# Patient Record
Sex: MALE | Race: WHITE | Employment: PART TIME | URBAN - METROPOLITAN AREA
[De-identification: names, ages, dates, MRNs, and addresses within clinical notes are randomized per-mention and may not be internally consistent; named-entity substitution may affect disease eponyms.]

---

## 2019-07-10 ENCOUNTER — APPOINTMENT (OUTPATIENT)
Dept: CARDIOLOGY | Facility: CLINIC | Age: 19
DRG: 316 | End: 2019-07-10
Attending: EMERGENCY MEDICINE
Payer: COMMERCIAL

## 2019-07-10 ENCOUNTER — APPOINTMENT (OUTPATIENT)
Dept: GENERAL RADIOLOGY | Facility: CLINIC | Age: 19
DRG: 316 | End: 2019-07-10
Attending: EMERGENCY MEDICINE
Payer: COMMERCIAL

## 2019-07-10 ENCOUNTER — HOSPITAL ENCOUNTER (INPATIENT)
Facility: CLINIC | Age: 19
LOS: 3 days | Discharge: HOME | DRG: 315 | End: 2019-07-13
Attending: INTERNAL MEDICINE | Admitting: INTERNAL MEDICINE
Payer: COMMERCIAL

## 2019-07-10 ENCOUNTER — HOSPITAL ENCOUNTER (INPATIENT)
Facility: CLINIC | Age: 19
LOS: 1 days | Discharge: ANOTHER HOSPITAL | DRG: 316 | End: 2019-07-10
Attending: EMERGENCY MEDICINE | Admitting: HOSPITALIST
Payer: COMMERCIAL

## 2019-07-10 VITALS
DIASTOLIC BLOOD PRESSURE: 66 MMHG | RESPIRATION RATE: 20 BRPM | HEART RATE: 85 BPM | SYSTOLIC BLOOD PRESSURE: 105 MMHG | WEIGHT: 160 LBS | OXYGEN SATURATION: 100 % | TEMPERATURE: 98 F

## 2019-07-10 DIAGNOSIS — I31.9 MYOPERICARDITIS: ICD-10-CM

## 2019-07-10 DIAGNOSIS — I41: Primary | ICD-10-CM

## 2019-07-10 DIAGNOSIS — J02.0 STREPTOCOCCAL PHARYNGITIS: ICD-10-CM

## 2019-07-10 PROBLEM — I51.4 MYOCARDITIS (H): Status: ACTIVE | Noted: 2019-07-10

## 2019-07-10 LAB
ANION GAP SERPL CALCULATED.3IONS-SCNC: 9 MMOL/L (ref 3–14)
BASOPHILS # BLD AUTO: 0.1 10E9/L (ref 0–0.2)
BASOPHILS NFR BLD AUTO: 0.5 %
BUN SERPL-MCNC: 14 MG/DL (ref 7–30)
CALCIUM SERPL-MCNC: 9.7 MG/DL (ref 8.5–10.1)
CHLORIDE SERPL-SCNC: 104 MMOL/L (ref 94–109)
CO2 SERPL-SCNC: 23 MMOL/L (ref 20–32)
CREAT SERPL-MCNC: 1.01 MG/DL (ref 0.66–1.25)
CRP SERPL-MCNC: 114 MG/L (ref 0–8)
DIFFERENTIAL METHOD BLD: NORMAL
EOSINOPHIL # BLD AUTO: 0.1 10E9/L (ref 0–0.7)
EOSINOPHIL NFR BLD AUTO: 0.5 %
ERYTHROCYTE [DISTWIDTH] IN BLOOD BY AUTOMATED COUNT: 11.6 % (ref 10–15)
ERYTHROCYTE [SEDIMENTATION RATE] IN BLOOD BY WESTERGREN METHOD: 21 MM/H (ref 0–15)
GFR SERPL CREATININE-BSD FRML MDRD: >90 ML/MIN/{1.73_M2}
GLUCOSE SERPL-MCNC: 124 MG/DL (ref 70–99)
HCT VFR BLD AUTO: 44.8 % (ref 40–53)
HGB BLD-MCNC: 15.8 G/DL (ref 13.3–17.7)
IMM GRANULOCYTES # BLD: 0 10E9/L (ref 0–0.4)
IMM GRANULOCYTES NFR BLD: 0.4 %
INTERPRETATION ECG - MUSE: NORMAL
LYMPHOCYTES # BLD AUTO: 1.9 10E9/L (ref 0.8–5.3)
LYMPHOCYTES NFR BLD AUTO: 17.4 %
MCH RBC QN AUTO: 30.3 PG (ref 26.5–33)
MCHC RBC AUTO-ENTMCNC: 35.3 G/DL (ref 31.5–36.5)
MCV RBC AUTO: 86 FL (ref 78–100)
MONOCYTES # BLD AUTO: 1.1 10E9/L (ref 0–1.3)
MONOCYTES NFR BLD AUTO: 9.8 %
NEUTROPHILS # BLD AUTO: 7.8 10E9/L (ref 1.6–8.3)
NEUTROPHILS NFR BLD AUTO: 71.4 %
NRBC # BLD AUTO: 0 10*3/UL
NRBC BLD AUTO-RTO: 0 /100
PLATELET # BLD AUTO: 257 10E9/L (ref 150–450)
POTASSIUM SERPL-SCNC: 3.4 MMOL/L (ref 3.4–5.3)
RBC # BLD AUTO: 5.22 10E12/L (ref 4.4–5.9)
SODIUM SERPL-SCNC: 136 MMOL/L (ref 133–144)
TROPONIN I SERPL-MCNC: 14.06 UG/L (ref 0–0.04)
TROPONIN I SERPL-MCNC: 30.91 UG/L (ref 0–0.04)
TROPONIN I SERPL-MCNC: 46.71 UG/L (ref 0–0.04)
WBC # BLD AUTO: 11 10E9/L (ref 4–11)

## 2019-07-10 PROCEDURE — 84484 ASSAY OF TROPONIN QUANT: CPT | Performed by: EMERGENCY MEDICINE

## 2019-07-10 PROCEDURE — 99223 1ST HOSP IP/OBS HIGH 75: CPT | Mod: AI | Performed by: PHYSICIAN ASSISTANT

## 2019-07-10 PROCEDURE — 36415 COLL VENOUS BLD VENIPUNCTURE: CPT | Performed by: PHYSICIAN ASSISTANT

## 2019-07-10 PROCEDURE — 12000000 ZZH R&B MED SURG/OB

## 2019-07-10 PROCEDURE — 36415 COLL VENOUS BLD VENIPUNCTURE: CPT | Performed by: EMERGENCY MEDICINE

## 2019-07-10 PROCEDURE — 25000132 ZZH RX MED GY IP 250 OP 250 PS 637: Performed by: PHYSICIAN ASSISTANT

## 2019-07-10 PROCEDURE — 99222 1ST HOSP IP/OBS MODERATE 55: CPT | Mod: 25 | Performed by: INTERNAL MEDICINE

## 2019-07-10 PROCEDURE — 25000132 ZZH RX MED GY IP 250 OP 250 PS 637: Performed by: EMERGENCY MEDICINE

## 2019-07-10 PROCEDURE — 71046 X-RAY EXAM CHEST 2 VIEWS: CPT

## 2019-07-10 PROCEDURE — 96374 THER/PROPH/DIAG INJ IV PUSH: CPT

## 2019-07-10 PROCEDURE — 80048 BASIC METABOLIC PNL TOTAL CA: CPT | Performed by: EMERGENCY MEDICINE

## 2019-07-10 PROCEDURE — 86060 ANTISTREPTOLYSIN O TITER: CPT | Performed by: EMERGENCY MEDICINE

## 2019-07-10 PROCEDURE — 99207 ZZC CHGS TRANSFERRED TO HOSPITAL: CPT | Performed by: INTERNAL MEDICINE

## 2019-07-10 PROCEDURE — 85025 COMPLETE CBC W/AUTO DIFF WBC: CPT | Performed by: EMERGENCY MEDICINE

## 2019-07-10 PROCEDURE — 21000001 ZZH R&B HEART CARE

## 2019-07-10 PROCEDURE — 25000128 H RX IP 250 OP 636: Performed by: EMERGENCY MEDICINE

## 2019-07-10 PROCEDURE — 93308 TTE F-UP OR LMTD: CPT

## 2019-07-10 PROCEDURE — 87040 BLOOD CULTURE FOR BACTERIA: CPT | Performed by: EMERGENCY MEDICINE

## 2019-07-10 PROCEDURE — 25000132 ZZH RX MED GY IP 250 OP 250 PS 637: Performed by: INTERNAL MEDICINE

## 2019-07-10 PROCEDURE — 86140 C-REACTIVE PROTEIN: CPT | Performed by: EMERGENCY MEDICINE

## 2019-07-10 PROCEDURE — 25000132 ZZH RX MED GY IP 250 OP 250 PS 637: Performed by: NURSE PRACTITIONER

## 2019-07-10 PROCEDURE — 99285 EMERGENCY DEPT VISIT HI MDM: CPT | Mod: 25

## 2019-07-10 PROCEDURE — 85652 RBC SED RATE AUTOMATED: CPT | Performed by: EMERGENCY MEDICINE

## 2019-07-10 PROCEDURE — 84484 ASSAY OF TROPONIN QUANT: CPT | Performed by: PHYSICIAN ASSISTANT

## 2019-07-10 PROCEDURE — 93308 TTE F-UP OR LMTD: CPT | Mod: 26 | Performed by: INTERNAL MEDICINE

## 2019-07-10 PROCEDURE — 93325 DOPPLER ECHO COLOR FLOW MAPG: CPT | Mod: 26 | Performed by: INTERNAL MEDICINE

## 2019-07-10 PROCEDURE — 93321 DOPPLER ECHO F-UP/LMTD STD: CPT | Mod: 26 | Performed by: INTERNAL MEDICINE

## 2019-07-10 PROCEDURE — 93005 ELECTROCARDIOGRAM TRACING: CPT

## 2019-07-10 RX ORDER — ACETAMINOPHEN 325 MG/1
650 TABLET ORAL EVERY 4 HOURS PRN
Status: DISCONTINUED | OUTPATIENT
Start: 2019-07-10 | End: 2019-07-13 | Stop reason: HOSPADM

## 2019-07-10 RX ORDER — AMOXICILLIN 250 MG
2 CAPSULE ORAL 2 TIMES DAILY PRN
Status: DISCONTINUED | OUTPATIENT
Start: 2019-07-10 | End: 2019-07-13 | Stop reason: HOSPADM

## 2019-07-10 RX ORDER — IBUPROFEN 400 MG/1
800 TABLET, FILM COATED ORAL EVERY 6 HOURS
Status: DISCONTINUED | OUTPATIENT
Start: 2019-07-10 | End: 2019-07-12

## 2019-07-10 RX ORDER — COLCHICINE 0.6 MG/1
0.6 TABLET ORAL ONCE
Status: COMPLETED | OUTPATIENT
Start: 2019-07-10 | End: 2019-07-10

## 2019-07-10 RX ORDER — NALOXONE HYDROCHLORIDE 0.4 MG/ML
.1-.4 INJECTION, SOLUTION INTRAMUSCULAR; INTRAVENOUS; SUBCUTANEOUS
Status: DISCONTINUED | OUTPATIENT
Start: 2019-07-10 | End: 2019-07-10 | Stop reason: HOSPADM

## 2019-07-10 RX ORDER — PANTOPRAZOLE SODIUM 20 MG/1
20 TABLET, DELAYED RELEASE ORAL
Status: DISCONTINUED | OUTPATIENT
Start: 2019-07-10 | End: 2019-07-10

## 2019-07-10 RX ORDER — PENICILLIN V POTASSIUM 500 MG/1
500 TABLET, FILM COATED ORAL 2 TIMES DAILY
Status: DISCONTINUED | OUTPATIENT
Start: 2019-07-10 | End: 2019-07-10 | Stop reason: HOSPADM

## 2019-07-10 RX ORDER — AMOXICILLIN 250 MG
1 CAPSULE ORAL 2 TIMES DAILY PRN
Status: DISCONTINUED | OUTPATIENT
Start: 2019-07-10 | End: 2019-07-13 | Stop reason: HOSPADM

## 2019-07-10 RX ORDER — PENICILLIN V POTASSIUM 500 MG/1
500 TABLET, FILM COATED ORAL 2 TIMES DAILY
Status: ON HOLD | DISCHARGE
Start: 2019-07-10 | End: 2019-07-10

## 2019-07-10 RX ORDER — ONDANSETRON 2 MG/ML
4 INJECTION INTRAMUSCULAR; INTRAVENOUS EVERY 6 HOURS PRN
Status: DISCONTINUED | OUTPATIENT
Start: 2019-07-10 | End: 2019-07-13 | Stop reason: HOSPADM

## 2019-07-10 RX ORDER — ALBUTEROL SULFATE 90 UG/1
1-2 AEROSOL, METERED RESPIRATORY (INHALATION) EVERY 6 HOURS PRN
COMMUNITY

## 2019-07-10 RX ORDER — LIDOCAINE 40 MG/G
CREAM TOPICAL
Status: DISCONTINUED | OUTPATIENT
Start: 2019-07-10 | End: 2019-07-10

## 2019-07-10 RX ORDER — COLCHICINE 0.6 MG/1
0.6 TABLET ORAL 2 TIMES DAILY
Status: ON HOLD | DISCHARGE
Start: 2019-07-10 | End: 2019-07-13

## 2019-07-10 RX ORDER — AMOXICILLIN 250 MG
1 CAPSULE ORAL 2 TIMES DAILY PRN
Status: DISCONTINUED | OUTPATIENT
Start: 2019-07-10 | End: 2019-07-10 | Stop reason: HOSPADM

## 2019-07-10 RX ORDER — KETOROLAC TROMETHAMINE 15 MG/ML
15 INJECTION, SOLUTION INTRAMUSCULAR; INTRAVENOUS ONCE
Status: COMPLETED | OUTPATIENT
Start: 2019-07-10 | End: 2019-07-10

## 2019-07-10 RX ORDER — ONDANSETRON 4 MG/1
4 TABLET, ORALLY DISINTEGRATING ORAL EVERY 6 HOURS PRN
Status: DISCONTINUED | OUTPATIENT
Start: 2019-07-10 | End: 2019-07-13 | Stop reason: HOSPADM

## 2019-07-10 RX ORDER — ACETAMINOPHEN 325 MG/1
650 TABLET ORAL EVERY 4 HOURS PRN
Status: DISCONTINUED | OUTPATIENT
Start: 2019-07-10 | End: 2019-07-10 | Stop reason: HOSPADM

## 2019-07-10 RX ORDER — SODIUM CHLORIDE 9 MG/ML
INJECTION, SOLUTION INTRAVENOUS CONTINUOUS
Status: DISCONTINUED | OUTPATIENT
Start: 2019-07-11 | End: 2019-07-12

## 2019-07-10 RX ORDER — COLCHICINE 0.6 MG/1
0.6 TABLET ORAL 2 TIMES DAILY
Status: DISCONTINUED | OUTPATIENT
Start: 2019-07-10 | End: 2019-07-10 | Stop reason: HOSPADM

## 2019-07-10 RX ORDER — PANTOPRAZOLE SODIUM 40 MG/1
40 TABLET, DELAYED RELEASE ORAL
Status: DISCONTINUED | OUTPATIENT
Start: 2019-07-11 | End: 2019-07-13 | Stop reason: HOSPADM

## 2019-07-10 RX ORDER — HYDROMORPHONE HYDROCHLORIDE 1 MG/ML
0.5 INJECTION, SOLUTION INTRAMUSCULAR; INTRAVENOUS; SUBCUTANEOUS ONCE
Status: DISCONTINUED | OUTPATIENT
Start: 2019-07-10 | End: 2019-07-10

## 2019-07-10 RX ORDER — PROCHLORPERAZINE MALEATE 5 MG
10 TABLET ORAL EVERY 6 HOURS PRN
Status: DISCONTINUED | OUTPATIENT
Start: 2019-07-10 | End: 2019-07-13 | Stop reason: HOSPADM

## 2019-07-10 RX ORDER — ONDANSETRON 2 MG/ML
4 INJECTION INTRAMUSCULAR; INTRAVENOUS EVERY 6 HOURS PRN
Status: DISCONTINUED | OUTPATIENT
Start: 2019-07-10 | End: 2019-07-10 | Stop reason: HOSPADM

## 2019-07-10 RX ORDER — IBUPROFEN 200 MG
400 TABLET ORAL EVERY 6 HOURS PRN
Status: ON HOLD | COMMUNITY
End: 2019-07-13

## 2019-07-10 RX ORDER — DIAZEPAM 5 MG
5 TABLET ORAL
Status: DISCONTINUED | OUTPATIENT
Start: 2019-07-11 | End: 2019-07-13 | Stop reason: HOSPADM

## 2019-07-10 RX ORDER — LIDOCAINE 40 MG/G
CREAM TOPICAL
Status: DISCONTINUED | OUTPATIENT
Start: 2019-07-10 | End: 2019-07-13 | Stop reason: HOSPADM

## 2019-07-10 RX ORDER — IBUPROFEN 400 MG/1
800 TABLET, FILM COATED ORAL EVERY 6 HOURS PRN
Status: DISCONTINUED | OUTPATIENT
Start: 2019-07-10 | End: 2019-07-10

## 2019-07-10 RX ORDER — COLCHICINE 0.6 MG/1
0.6 TABLET ORAL 2 TIMES DAILY
Status: DISCONTINUED | OUTPATIENT
Start: 2019-07-10 | End: 2019-07-13 | Stop reason: HOSPADM

## 2019-07-10 RX ORDER — LIDOCAINE 40 MG/G
CREAM TOPICAL
Status: DISCONTINUED | OUTPATIENT
Start: 2019-07-10 | End: 2019-07-10 | Stop reason: HOSPADM

## 2019-07-10 RX ORDER — TRAMADOL HYDROCHLORIDE 50 MG/1
50 TABLET ORAL 3 TIMES DAILY
Status: DISCONTINUED | OUTPATIENT
Start: 2019-07-10 | End: 2019-07-13 | Stop reason: HOSPADM

## 2019-07-10 RX ORDER — ACETAMINOPHEN 650 MG/1
650 SUPPOSITORY RECTAL EVERY 4 HOURS PRN
Status: DISCONTINUED | OUTPATIENT
Start: 2019-07-10 | End: 2019-07-13 | Stop reason: HOSPADM

## 2019-07-10 RX ORDER — PANTOPRAZOLE SODIUM 20 MG/1
20 TABLET, DELAYED RELEASE ORAL ONCE
Status: COMPLETED | OUTPATIENT
Start: 2019-07-10 | End: 2019-07-10

## 2019-07-10 RX ORDER — NALOXONE HYDROCHLORIDE 0.4 MG/ML
.1-.4 INJECTION, SOLUTION INTRAMUSCULAR; INTRAVENOUS; SUBCUTANEOUS
Status: DISCONTINUED | OUTPATIENT
Start: 2019-07-10 | End: 2019-07-13 | Stop reason: HOSPADM

## 2019-07-10 RX ORDER — POLYETHYLENE GLYCOL 3350 17 G/17G
17 POWDER, FOR SOLUTION ORAL DAILY PRN
Status: DISCONTINUED | OUTPATIENT
Start: 2019-07-10 | End: 2019-07-13 | Stop reason: HOSPADM

## 2019-07-10 RX ORDER — PENICILLIN V POTASSIUM 500 MG/1
500 TABLET, FILM COATED ORAL 2 TIMES DAILY
Status: DISCONTINUED | OUTPATIENT
Start: 2019-07-10 | End: 2019-07-13 | Stop reason: HOSPADM

## 2019-07-10 RX ORDER — AMOXICILLIN 250 MG
2 CAPSULE ORAL 2 TIMES DAILY PRN
Status: DISCONTINUED | OUTPATIENT
Start: 2019-07-10 | End: 2019-07-10 | Stop reason: HOSPADM

## 2019-07-10 RX ORDER — ACETAMINOPHEN 500 MG
1000 TABLET ORAL EVERY 6 HOURS PRN
COMMUNITY

## 2019-07-10 RX ORDER — PROCHLORPERAZINE 25 MG
25 SUPPOSITORY, RECTAL RECTAL EVERY 12 HOURS PRN
Status: DISCONTINUED | OUTPATIENT
Start: 2019-07-10 | End: 2019-07-13 | Stop reason: HOSPADM

## 2019-07-10 RX ORDER — ONDANSETRON 4 MG/1
4 TABLET, ORALLY DISINTEGRATING ORAL EVERY 6 HOURS PRN
Status: DISCONTINUED | OUTPATIENT
Start: 2019-07-10 | End: 2019-07-10 | Stop reason: HOSPADM

## 2019-07-10 RX ORDER — PENICILLIN V POTASSIUM 500 MG/1
500 TABLET, FILM COATED ORAL 2 TIMES DAILY
Status: ON HOLD | COMMUNITY
End: 2019-07-13

## 2019-07-10 RX ORDER — FENTANYL CITRATE 50 UG/ML
25 INJECTION, SOLUTION INTRAMUSCULAR; INTRAVENOUS EVERY 6 HOURS PRN
Status: DISCONTINUED | OUTPATIENT
Start: 2019-07-10 | End: 2019-07-13 | Stop reason: HOSPADM

## 2019-07-10 RX ADMIN — PANTOPRAZOLE SODIUM 20 MG: 20 TABLET, DELAYED RELEASE ORAL at 16:53

## 2019-07-10 RX ADMIN — IBUPROFEN 800 MG: 400 TABLET ORAL at 16:53

## 2019-07-10 RX ADMIN — COLCHICINE 0.6 MG: 0.6 TABLET, FILM COATED ORAL at 21:01

## 2019-07-10 RX ADMIN — KETOROLAC TROMETHAMINE 15 MG: 15 INJECTION, SOLUTION INTRAMUSCULAR; INTRAVENOUS at 06:20

## 2019-07-10 RX ADMIN — PENICILLIN V POTASSIUM 500 MG: 500 TABLET, FILM COATED ORAL at 13:19

## 2019-07-10 RX ADMIN — COLCHICINE 0.6 MG: 0.6 TABLET, FILM COATED ORAL at 07:24

## 2019-07-10 RX ADMIN — PANTOPRAZOLE SODIUM 20 MG: 20 TABLET, DELAYED RELEASE ORAL at 21:02

## 2019-07-10 RX ADMIN — TRAMADOL HYDROCHLORIDE 50 MG: 50 TABLET, COATED ORAL at 21:02

## 2019-07-10 RX ADMIN — ACETAMINOPHEN 650 MG: 325 TABLET, FILM COATED ORAL at 15:08

## 2019-07-10 RX ADMIN — PENICILLIN V POTASSIUM 500 MG: 500 TABLET, FILM COATED ORAL at 22:34

## 2019-07-10 RX ADMIN — IBUPROFEN 800 MG: 400 TABLET ORAL at 22:34

## 2019-07-10 ASSESSMENT — ACTIVITIES OF DAILY LIVING (ADL)
DRESS: 0-->INDEPENDENT
AMBULATION: 0-->INDEPENDENT
COGNITION: 0 - NO COGNITION ISSUES REPORTED
RETIRED_EATING: 0-->INDEPENDENT
BATHING: 0-->INDEPENDENT
ADLS_ACUITY_SCORE: 11
RETIRED_COMMUNICATION: 0-->UNDERSTANDS/COMMUNICATES WITHOUT DIFFICULTY
ADLS_ACUITY_SCORE: 19
FALL_HISTORY_WITHIN_LAST_SIX_MONTHS: NO
TOILETING: 0-->INDEPENDENT
TRANSFERRING: 0-->INDEPENDENT
PRIOR_FUNCTIONAL_LEVEL_COMMENT: INDEP
SWALLOWING: 0-->SWALLOWS FOODS/LIQUIDS WITHOUT DIFFICULTY
ADLS_ACUITY_SCORE: 11

## 2019-07-10 ASSESSMENT — ENCOUNTER SYMPTOMS
FEVER: 1
SHORTNESS OF BREATH: 1
COUGH: 1
SORE THROAT: 1

## 2019-07-10 NOTE — PLAN OF CARE
DX: Myopericarditis  HX: Strep throat  LABS: Troponin 14->46  TESTS: Echo (EF 40% Global hypokinesis with a moderate regional wall motion abnormality in the anteroseptal and apical areas)  TELE: SR 90's  ASSESS: Chest pain (which was present on arrival to ED) and sore throat absent.   TEACHING: Discussed transfer for higher level of care + admission information with mom and patient  PAIN: None  PLAN: Report called to 252, patient needs cardiac MRI and higher level of care

## 2019-07-10 NOTE — PROGRESS NOTES
[facility]  Cardiology Progress Note  Date of Service: 07/10/2019    Assessment & Plan    Barak Urrutia is a 18 year old male with past medical history significant for seasonal allergies and a recent URI and was diagnosed with strep pharyngitis on 7/8/19 and started on antibiotics. He presented to [facility] 7/10/2019 with chest pain, and he was diagnosed with myopericarditis with mild LV dysfunction. Subsequently, he transferred to [facility] for a cardiac MRI.    Assessment:   1. Myopericarditis with mild LV dysfunction     LVEF was 45-50% with borderline global hypokinesis and moderate RWMA in the anteroseptal and apical areas    Cardiac MRI will look for the extent of inflammation, LV dysfunction and assess for CAD with RWMA noted on echo    Troponin 14-46 without peak    Sed rate 21 and     Transferred over on colchicine 0.6 mg BID and Ibuprofen 500 mg BID     2. Strep pharyngitis    Febrile on 7/8 and started on penicillin    Plan:  1. NPO from midnight for cardiac MRI tomorrow  2. Ibuprofen 800 mg QID  3. Pantoprazole 20 mg BID  4. Continue to trend troponin    DEIRDRE DALTON, APRN CNP  Pager:  [phone]   (7am - 5pm, M-F)    Interval History   No complaints of chest pain. No complaint is sore throat.    Physical Exam   Temp: 98.7  F (37.1  C) Temp src: Oral BP: 107/58   Heart Rate: 85 Resp: 20        There were no vitals filed for this visit.    Constitutional:   NAD   Skin:   Warm and dry   Head:   Nontraumatic   Neck:   no JVD   Lungs:   symmetric, clear to auscultation   Cardiovascular:   regular rate and rhythm and normal S1 and S2   Abdomen:   Benign   Extremities and Back:   No edema   Neurological:   Grossly nonfocal       Medications       sodium chloride (PF)  3 mL Intracatheter Q8H       Data     Most Recent 3 CBC's:  Recent Labs   Lab Test 07/10/19  0618   WBC 11.0   HGB 15.8   MCV 86        Most Recent 3 BMP's:  Recent Labs   Lab Test 07/10/19  0618       POTASSIUM 3.4   CHLORIDE 104   CO2 23   BUN 14   CR 1.01   ANIONGAP 9   SIGIFREDO 9.7   *     Most Recent 3 Troponin's:  Recent Labs   Lab Test 07/10/19  1113 07/10/19  0618   TROPI 46.714* 14.056*     Most Recent D-dimer:No lab results found.  Most Recent Cholesterol Panel:No lab results found.  Most Recent ESR & CRP:  Recent Labs   Lab Test 07/10/19  0618   SED 21*   .0*

## 2019-07-10 NOTE — CONSULTS
Consult Date:  07/10/2019      CARDIOLOGY CONSULTATION      DATE OF SERVICE:  07/10/2019.      REFERRING PROVIDER:  Odilon Montero MD.      REASON FOR REFERRAL:  Abnormal cardiac enzymes, myopericarditis.      HISTORY OF PRESENT ILLNESS:  I have been asked to evaluate this very pleasant 18-year-old male for the above.  He came into the emergency room with symptoms of chest pain that started this morning.  He was given medication in the emergency room that did relieve the discomfort, including colchicine and Toradol.  He has a history of upper respiratory infection within the last few weeks and he was diagnosed with strep pharyngitis on Monday.  He was started on penicillin.  He has no other medical history.  He denies any current chest pain or difficulty breathing.      PAST MEDICAL HISTORY:  He uses the E-cigarette tobacco abuse and has seasonal allergies and occasionally will use an inhaler.      PAST SURGICAL HISTORY:  No surgical history.      SOCIAL HISTORY:  E-cigarettes.  No illicit drug use.      FAMILY HISTORY:  Negative for premature coronary disease.      ALLERGIES:  NO KNOWN DRUG ALLERGIES.      MEDICATIONS:  Recent medications including penicillin, albuterol on occasion.      REVIEW OF SYSTEMS:  He has not had previous symptoms of chest pain or difficulty breathing.  No physical limitations growing up.  He works with his parents at a semi-physical job, and he has not had any physical limitations preceding this illness.      PHYSICAL EXAMINATION:   VITAL SIGNS:  His blood pressure is currently ranging anywhere between 99 to 120 systolic over 60 to 85 diastolic.  Heart rates in the 80s, respiratory rate 18.  He is oxygenating at least 97% on room air.  He is afebrile.   GENERAL:  He is a healthy young male in no apparent distress.  Eupneic on exam with conversation.   HEENT:  Head is atraumatic and normocephalic.  Pupils are equal and small.  Conjunctivae are clear.   NECK:  Supple.  I do not appreciate  jugular venous distention.   SKIN:  Warm, dry without rash or jaundice.   CARDIOVASCULAR:  Tones are regular.  I did not appreciate a murmur, gallop or rub.   LUNGS:  Clear posteriorly.   ABDOMEN:  Soft.  Bowel sounds are active.   EXTREMITIES:  He has no peripheral edema.   NEUROLOGIC:  Alert and oriented.  There are no gross focal neurologic abnormalities.      DIAGNOSTIC DATA:  An electrocardiogram performed, which I have reviewed, demonstrates a normal sinus rhythm with diffuse ST elevation seen throughout the limb leads and the anterior precordial leads, consistent with pericarditis.        A chest x-ray was performed and read by Radiology as no acute abnormality.        Echocardiogram has already been performed and read as mild LV dysfunction with an EF estimated around 45% to 50%.  There are some borderline global hypokinesis with a moderate regional wall motion abnormality in the anteroseptal and apical areas.  No significant valvular abnormalities were seen.      LABORATORY DATA:  His troponin is initially 14.  CRP level of 114.  Electrolyte panel and CBC are normal.      ASSESSMENT AND PLAN:  This is a 18-year-old male with strep pharyngitis complicated by myopericarditis with mild LV dysfunction.  He has been started on colchicine and should remain on colchicine until his CRP normalizes.  I would like to transfer him to [facility] for a cardiac MRI to determine the extent of inflammation, LV dysfunction, and also to determine if there is any coronary artery disease, given the regional wall motion abnormality.  I have discussed this case with my partner, Dr. Radha Bernal, who is accepting of the transfer.  I did discuss with him and his mother, who is present in the room, that he will be on strict limited physical activity until his inflammatory markers normalize.  We will also continue supportive cares and treatment of his strep pharyngitis.        Please feel free to contact me with any  questions you have in regards to his care.         MAGY GRAJEDA DO             D: 07/10/2019   T: 07/10/2019   MT: RODRIGUEZ      Name:     ANGELINE GERMAIN   MRN:      [id number]        Account:       [id number]   :      2000           Consult Date:  07/10/2019      Document: [id number]       cc: Kendrick Montero MD

## 2019-07-10 NOTE — PLAN OF CARE
VSS on RA.  Tele: SR.  Up ind.  Informed pt to minimal activity-bathroom privileges only.  Pt reported CP 4/10 with sweating.  O2 applied and talked with Dr. Garner for meds to kick in.  Call light within reach.  Will continue to monitor.

## 2019-07-10 NOTE — ED TRIAGE NOTES
Pt in with C/O cough, chest pain, SOB and sore throat. Pt reports he is currently being treated for strep throat. Pt on amoxicillin. Pt used albuterol inhaler and took Ibuprofen PTA. A&Ox4 ABCD's intact

## 2019-07-10 NOTE — H&P
History and Physical     Barak Urrutia MRN# [id number]   YOB: 2000 Age: 18 year old      Date of Admission:  7/10/2019    Primary care provider: Kendrick Montero          Assessment and Plan:   Barak Urrutia is a 18 year old male with a PMH significant for recent dx of strep pharyngitis, who presents with chest pain.     1. Myopericarditis - pt reports cold like sx a week ago, noted fever of 103 on Monday, presented to [facility], diagnosed with strep throat and started on penicillin V. Continues to have fevers, developed chest pain this morning with mild SOB, worse with sitting up. Diffuse ST elevation on EKG consistent with pericarditis. Troponin elevated at 14.056, CRP and ESR elevated as well. Labs were otherwise unremarkable. Echocardiogram was done in the ED, EF 45-50% with global hypokinesis of the left ventricle. Received 0.6 mg of PO colchicine in ED, will continue BID. Will hold off on NSAIDs and defer to cardiology. Monitor on tele and trend troponins. ASO titer pending to confirm strep pharyngitis. Will resume previously prescribed PO abx (pen V) for now      Prophylaxis - mechanical, ambulate  Code status - Full Code  Dispo - admit to inpatient                Chief Complaint:   Chest pain         History of Present Illness:   Barak Urrutia is a 18 year old male with a PMH significant for recent dx of strep pharyngitis, who presents with chest pain. The patient reports having a cold last week with cough, sore throat and mild shortness of breath. Upon returning from his cabin over the weekend, he developed fever of 103. He went to [facility] on Monday for evaluation and was diagnosed with strep throat and started on a 10 day course Penicillin V. He woke up around [id number] with mild central sharp chest pain. He got up and took some ibuprofen and went back to sleep. He woke up later in the morning with more severe chest pain that was worse with a deep breath and worse  with sitting up. He also noted worse sob. He has continued to have fevers all week. He denies abdominal pain, diarrhea or dysuria. He does not vomiting once on Tuesday.  In the ED, VSS. Afebrile here. BMP and CBC are unremarkable. Troponin was significantly elevated at 14.056. CRP and ESR were elevated at 114.0 and 21 respectively. CXR was clear. EKG showed diffuse ST elevation consistent with acute pericarditis. Blood cultures were obtained and an echocardiogram was done in the ED which did show a reduced EF of 45-50% and global hypokinesis of the left ventricle, full report below. He was given 0.6 mg PO colchicine and 15 mg IV Toradol.    Hx obtained by speaking with ED physician, chart review and pt interview.               Past Medical History:   No significant PMHx          Past Surgical History:   No PSHx           Social History:     Social History     Socioeconomic History     Marital status: Single     Spouse name: Not on file     Number of children: Not on file     Years of education: Not on file     Highest education level: Not on file   Occupational History     Not on file   Social Needs     Financial resource strain: Not on file     Food insecurity:     Worry: Not on file     Inability: Not on file     Transportation needs:     Medical: Not on file     Non-medical: Not on file   Tobacco Use     Smoking status: Not on file   Substance and Sexual Activity     Alcohol use: Not on file     Drug use: Not on file     Sexual activity: Not on file   Lifestyle     Physical activity:     Days per week: Not on file     Minutes per session: Not on file     Stress: Not on file   Relationships     Social connections:     Talks on phone: Not on file     Gets together: Not on file     Attends Christian service: Not on file     Active member of club or organization: Not on file     Attends meetings of clubs or organizations: Not on file     Relationship status: Not on file     Intimate partner violence:     Fear of  current or ex partner: Not on file     Emotionally abused: Not on file     Physically abused: Not on file     Forced sexual activity: Not on file   Other Topics Concern     Not on file   Social History Narrative     Not on file   denies tobacco or alcohol use            Family History:   CAD in grandparents          Allergies:    No Known Allergies            Medications:     Prior to Admission medications    Medication Sig Last Dose Taking? Auth Provider   acetaminophen (TYLENOL) 500 MG tablet Take 1,000 mg by mouth every 6 hours as needed for mild pain 7/9/2019 at Unknown time Yes Unknown, Entered By History   albuterol (PROAIR HFA/PROVENTIL HFA/VENTOLIN HFA) 108 (90 Base) MCG/ACT inhaler Inhale 1-2 puffs into the lungs every 6 hours as needed for shortness of breath / dyspnea or wheezing 7/10/2019 at 0400 Yes Unknown, Entered By History   ibuprofen (ADVIL/MOTRIN) 200 MG tablet Take 400 mg by mouth every 6 hours as needed for mild pain 7/10/2019 at 0400 Yes Unknown, Entered By History   penicillin V (VEETID) 500 MG tablet Take 500 mg by mouth 2 times daily 7/9/2019 at Unknown time Yes Unknown, Entered By History              Review of Systems:   A Comprehensive greater than 10 system review of systems was carried out.  Pertinent positives and negatives are noted above.  Otherwise negative for contributory information.     Review Of Systems  Skin: negative  Eyes: negative  Ears/Nose/Throat: negative  Respiratory: No shortness of breath, dyspnea on exertion, cough, or hemoptysis  Cardiovascular: negative  Gastrointestinal: negative  Genitourinary: negative  Musculoskeletal: negative  Neurologic: negative  Psychiatric: negative  Hematologic/Lymphatic/Immunologic: negative  Endocrine: negative             Physical Exam:   Blood pressure 109/76, pulse 85, temperature 97.9  F (36.6  C), temperature source Oral, resp. rate 19, weight 72.6 kg (160 lb), SpO2 99 %.  Exam:  GENERAL:  Comfortable.  PSYCH: pleasant, oriented,  No acute distress.  HEENT:  Atraumatic, normocephalic. Normal conjunctiva, normal hearing, and oropharynx is normal.  NECK:  Supple, no neck vein distention  HEART:  Normal S1, S2 with no murmur, no pericardial rub, gallops or S3 or S4.  LUNGS:  Clear to auscultation, normal Respiratory effort. No wheezing, rales or ronchi.  GI:  Soft, normal bowel sounds. Non-tender, non distended.   EXTREMITIES:  No pedal edema, +2 pulses bilateral and equal.  SKIN:  Dry to touch, No rash, wound or ulcerations.  NEUROLOGIC:  CN 2-12 intact, BL 5/5 symmetric upper and lower extremity strength, sensation is intact with no focal deficits.               Data:     EKG: SR with diffuse ST elevations consistent with acute pericarditis     Recent Labs   Lab 07/10/19  0618   WBC 11.0   HGB 15.8   HCT 44.8   MCV 86        Recent Labs   Lab 07/10/19  0618      POTASSIUM 3.4   CHLORIDE 104   CO2 23   ANIONGAP 9   *   BUN 14   CR 1.01   GFRESTIMATED >90   GFRESTBLACK >90   SIGIFREDO 9.7     Recent Labs   Lab 07/10/19  0618   SED 21*   .0*     Recent Labs   Lab 07/10/19  0618   TROPI 14.056*       Recent Results (from the past 48 hour(s))   Chest XR,  PA & LAT    Narrative    XR CHEST 2 VW   7/10/2019 6:49 AM     HISTORY: Chest pain. Pericarditis.    COMPARISON: None.    FINDINGS: The heart size is normal. The lungs are clear. No  pneumothorax or pleural effusion.      Impression    IMPRESSION: No acute abnormality.    MD Veda DAO PA-C    This patient was seen and discussed with Dr. Kenny who agrees with the current plans as outlined above.

## 2019-07-10 NOTE — ED PROVIDER NOTES
"  History     Chief Complaint:  Pharyngitis; Cough; and Shortness of Breath      HPI   Barak Urrutia is a 18 year old male who presents with pharyngitis, cough and shortness of breath. Patient is currently being treated for strep throat after returning home from a cabin four days ago with a fever, cough sore throat and shortness of breath; he had a cold prior to going to the cabin. Initially, his temperature was 103 at its peak three days ago. Mom then took patient to [facility], where he tested positive for strep throat. Patient was placed on amoxicillin, and has been taking this since. His symptoms have been persistent. Today, patient woke up and experienced acute onset of chest pain in the central chest. The pain is \"sharp and pressure\" and intermittent, aggravated with deep breathing and sitting up. Endorses shortness of breath with the pain. This morning at 0400 patient took two tablets of ibuprofen and used his albuterol inhaler for this shortness of breath and pain. The chest pain is not mobile. Denies rashes.     Cardiac/PE/DVT Risk Factors:  History of hypertension -  Negative   History of hyperlipidemia - Negative   History of diabetes - Negative   History of smoking - Negative   Personal history of PE/DVT - Negative   Family history of PE/DVT - Negative   Family history of heart complications - Negative   Recent travel - Negative   Recent surgery - Negative   Other immobilizations - Negative   Cancer - Negative     Allergies:  No Known Drug Allergies     Medications:    Albuterol inhaler     Past Medical History:    History reviewed. No pertinent past medical history.     Past Surgical History:    History reviewed. No pertinent past surgical history.     Family History:    History reviewed. No pertinent family history.     Social History:  The patient was accompanied to the ED by his mother.  Smoking Status: Never  Smokeless Tobacco: Never     Review of Systems   Constitutional: Positive for " fever.   HENT: Positive for sore throat.    Respiratory: Positive for cough and shortness of breath.    Cardiovascular: Positive for chest pain.   Skin: Negative for rash.   All other systems reviewed and are negative.      Physical Exam     Patient Vitals for the past 24 hrs:   BP Temp Temp src Pulse Resp SpO2 Weight   07/10/19 0547 107/78 -- -- -- -- -- --   07/10/19 0545 -- 97.9  F (36.6  C) Oral 73 18 100 % 72.6 kg (160 lb)      Physical Exam    General:   Pleasant, age appropriate male who appears in discomfort.  HEENT:    Oropharynx is moist, without lesions or trismus.     Bilateral tonsillar erythema with exudate.      No unilateral edema      No uvular deviation  Eyes:    Conjunctiva normal  Neck:    Supple, no meningismus.     CV:     Regular rate and rhythm.      No murmurs, rubs or gallops.        2+ radial pulses bilateral.       No  lower extremity edema.  PULM:    Clear to auscultation bilateral.       No respiratory distress.      Good air exchange.     No rales or wheezing.     No stridor.  ABD:    Soft, non-tender, non-distended.       No pulsatile masses.       No rebound, guarding or rigidity.  MSK:     No gross deformity to all four extremities.   LYMPH:   No cervical lymphadenopathy.  NEURO:   Alert. Good muscle tone, no atrophy.  Skin:    Warm, dry and intact.    Psych:    Mood is good and affect is appropriate.      Emergency Department Course     ECG:  Indication: Chest pain with shortness of breath   Completed at 0552.  Read at 0557.   Normal sinus rhythm   Acute pericarditis   Abnormal ECG    Rate 63 bpm. IA interval 128. QRS duration 94. QT/QTc 372/380. P-R-T axes 60 87 54.    Imaging:  Radiology findings were communicated with the patient and family who voiced understanding of the findings.    XR Chest 2 views  IMPRESSION: No acute abnormality.  Report per radiology     Echocardiogram complete:  pending   Report per cardiology    Laboratory:  Laboratory findings were communicated with  the patient and family who voiced understanding of the findings.    CBC: AWNL. (WBC 11.0, HGB 15.8, )   BMP: Glucose 124 (H) o/w WNL (Creatinine 1.01)    Troponin (Collected 618): 14.056 (HH)   Erythrocyte sedimentation rate auto: 21 (H)     CRP Inflammation: 114.0 (H)     Antistreptolysin O: pending     Blood cultures: Pending  Blood cultures: Pending    Interventions:  06 - Toradol 15mg IV  07 - Colchicine 0.6mg PO     Emergency Department Course:  Nursing notes and vitals reviewed.  EKG obtained in the ED, see results above.   The patient was sent for a CXR and Echocardiogram while in the emergency department, results above.   IV was inserted and blood was drawn for laboratory testing, results above.    0605: I performed an exam of the patient as documented above.    0707: Patient rechecked and updated. Echocardiogram currently being performed.     0711: I spoke with the PAJaiden, of the Cardiology service regarding patient's presentation, findings, and plan of care.     0722: I spoke with Dr. Duran of the Infectious Disease service regarding patient's presentation, findings, and plan of care.    0725: I spoke with Dr. Kenny of the Hospitalist service regarding patient's presentation, findings, and plan of care.    0744: Patient rechecked and updated.      Findings and plan explained to the Patient and mother who consents to admission. Discussed the patient with Dr. Kenny , who will admit the patient to a cardiac telemetry bed for further monitoring, evaluation, and treatment.    I personally reviewed the laboratory and imaging results with the Patient and mother and answered all related questions prior to admission.    Impression & Plan      Medical Decision Makin-year-old male presents the ED with a preceding syndrome of exudative pharyngitis with positive rapid strep test and now developing chest pain and shortness of breath.  His EKG is concerning for pericarditis with diffuse ST  elevation and [place] depression.  Patient had no overt pericardial friction rub on examination.  Laboratory studies reveal elevation of his his inflammatory markers and marked elevation of his troponin at 14.  This likely indicates myopericarditis as opposed to pericarditis alone.  Patient was given Toradol with improvement of pain as well as a initiation of colchicine.  Formal echocardiogram performed in the ED with no pericardial effusion although formal report pending to evaluate for ejection fraction.      I spoke with cardiology team who agreed with admission, colchicine and awaiting formal echocardiogram results.  I also spoke with infectious disease given the positive strep test and fever of 103.0.  Specifically we discussed whether IV antibiotics would be indicated at this time.  Dr. Duran feels this most likely represents viral illness with likely false positive rapid strep test.  Blood cultures are pending.  ASO titer sent and currently pending to further disprove streptococcal infection as source of myopericarditis.  Patient will be transferred to a monitored bed for further evaluation management.    Diagnosis:    ICD-10-CM   1. Myopericarditis I31.9       Disposition:  Admitted to Cardiac with Tele     Scribe Disclosure:  Judi NUÑEZ, am serving as a scribe at 6:06 AM on 7/10/2019 to document services personally performed by Ricky Steven MD based on my observations and the provider's statements to me.   7/10/2019   [facility] EMERGENCY DEPARTMENT       Ricky Steven MD  07/10/19 0802

## 2019-07-10 NOTE — ED NOTES
[facility]  ED Nurse Handoff Report    Barak Urrutia is a 18 year old male   ED Chief complaint: Pharyngitis; Cough; and Shortness of Breath  . ED Diagnosis:   Final diagnoses:   Myopericarditis     Allergies: No Known Allergies    Code Status: Full Code  Activity level - Baseline/Home:  Independent. Activity Level - Current:   Stand by Assist. Lift room needed: No. Bariatric: No   Needed: No   Isolation: No. Infection: Not Applicable.     Vital Signs:   Vitals:    07/10/19 0547 07/10/19 0625 07/10/19 0645 07/10/19 0700   BP: 107/78 120/85 99/57 108/76   Pulse:  66 75 70   Resp:  14 20    Temp:       TempSrc:       SpO2:  100% 97%    Weight:           Cardiac Rhythm:  ,   Cardiac  Cardiac Rhythm: Normal sinus rhythm  Pain level:    Patient confused: No. Patient Falls Risk: No.   Elimination Status: Has voided   Patient Report - Initial Complaint: Pt in with C/O cough, chest pain, SOB and sore throat. Pt reports he is currently being treated for strep throat. Pt on amoxicillin. Focused Assessment:    Cardiac Cardiac - Cardiac WDL: -WDL except  Capillary Refill, General: less than/equal to 3 secs   Chest Pain Assessment - Rating (0-10): 3  Chest Pain Location: midsternal   Cardiac Monitoring - EKG Monitoring: Yes  Cardiac Regularity: Regular  Cardiac Rhythm: NSR       05:57 Respiratory Respiratory - Respiratory WDL: -WDL except (Patient presents with stated diagnosis of strep on Monday night, awoke early this morning with SOB and midsternal chest pain, states he took his inhaler at 4am with no relief, patient appears very anxious, unable to sit still for more than 2 seconds) Breath Sounds: All Fields (Lung sounds clear in all fields)     Tests Performed: Labs, echo, chest xray. Abnormal Results:   Labs Ordered and Resulted from Time of ED Arrival Up to the Time of Departure from the ED   BASIC METABOLIC PANEL - Abnormal; Notable for the following components:       Result Value    Glucose 124  (*)     All other components within normal limits   TROPONIN I - Abnormal; Notable for the following components:    Troponin I ES 14.056 (*)     All other components within normal limits   ERYTHROCYTE SEDIMENTATION RATE AUTO - Abnormal; Notable for the following components:    Sed Rate 21 (*)     All other components within normal limits   CRP INFLAMMATION - Abnormal; Notable for the following components:    CRP Inflammation 114.0 (*)     All other components within normal limits   CBC WITH PLATELETS DIFFERENTIAL   ANTISTREPTOLYSIN O   PERIPHERAL IV CATHETER   CARDIAC CONTINUOUS MONITORING   BLOOD CULTURE   BLOOD CULTURE     Chest XR,  PA & LAT   Final Result   IMPRESSION: No acute abnormality.      MADY KEYES MD         Treatments provided: Toradol, colchicine, tele monitoring  Family Comments: Mother at bedside, supportive.  OBS brochure/video discussed/provided to patient:  N/A  ED Medications:   Medications   ketorolac (TORADOL) injection 15 mg (15 mg Intravenous Given 7/10/19 0620)   colchicine (COLCYRS) tablet 0.6 mg (0.6 mg Oral Given 7/10/19 0724)     Drips infusing:  No  For the majority of the shift, the patient's behavior Green. Interventions performed were N/A.     Severe Sepsis OR Septic Shock Diagnosis Present: No      ED Nurse Name/Phone Number: Jose Eduardo Baltazarsandy,   7:31 AM    RECEIVING UNIT ED HANDOFF REVIEW    Above ED Nurse Handoff Report was reviewed: Yes  Reviewed by: Johnathan Shepherd on July 10, 2019 at 7:55 AM

## 2019-07-10 NOTE — DISCHARGE SUMMARY
Pt presented with chest pain in the setting of recent strep pharyngitis dx and was found to have myopericarditis. Troponin and CRP are elevated. Echocardiogram and cardiology consult was obtained. Based on echo results, cardiology is recommending transfer to [facility] for cardiac MRI and further management. Pt was started on colchicine and this will continue for now. Continue abx for strep pharyngitis. Report was called to Dr. Avalos at [facility] and pt will transfer. Pt and his mother are aware and agree to transfer.     Veda Meyers PA-C

## 2019-07-10 NOTE — PHARMACY-ADMISSION MEDICATION HISTORY
Admission medication history interview status for this patient is complete. See [facility] admission navigator for allergy information, prior to admission medications and immunization status.     Medication history interview source(s):Patient  Medication history resources (including written lists, pill bottles, clinic record):Called Syl for abx  Primary pharmacy:[facility] [place]    Changes made to PTA medication list:  Added: all meds  Deleted:   Changed:     Actions taken by pharmacist (provider contacted, etc):None     Additional medication history information:None    Medication reconciliation/reorder completed by provider prior to medication history? No    Do you take OTC medications (eg tylenol, ibuprofen, fish oil, eye/ear drops, etc)? Y(Y/N)    For patients on insulin therapy: NA (Y/N)      Prior to Admission medications    Medication Sig Last Dose Taking? Auth Provider   acetaminophen (TYLENOL) 500 MG tablet Take 1,000 mg by mouth every 6 hours as needed for mild pain 7/9/2019 at Unknown time Yes Unknown, Entered By History   albuterol (PROAIR HFA/PROVENTIL HFA/VENTOLIN HFA) 108 (90 Base) MCG/ACT inhaler Inhale 1-2 puffs into the lungs every 6 hours as needed for shortness of breath / dyspnea or wheezing 7/10/2019 at 0400 Yes Unknown, Entered By History   ibuprofen (ADVIL/MOTRIN) 200 MG tablet Take 400 mg by mouth every 6 hours as needed for mild pain 7/10/2019 at 0400 Yes Unknown, Entered By History   penicillin V (VEETID) 500 MG tablet Take 500 mg by mouth 2 times daily 7/9/2019 at Unknown time Yes Unknown, Entered By History

## 2019-07-10 NOTE — H&P
[facility]    History and Physical - Hospitalist Service       Date of Admission:  7/10/2019    Assessment & Plan   Barak Urrutia is a 18 year old male with a past medical history of exercise induced asthma and recent diagnosis of strep pharyngitis who presented to the Emergency Department at [facility] for evaluation of chest pain and shortness of breath. Patient was identified to have findings consistent with myopericarditis and reduced EF on echocardiogram. Following consultation with Cardiology, he is transferred to [facility] for further treatment.    Myopericarditis  Strep Pharyngitis  Patient was diagnosed with strep pharyngitis on Monday, 7/8/19 and prescribed Amoxicillin. With ongoing fever, generalized malaise, sinus congestion and interval development of sob with chest pain prompting ED visit. EKG obtained and with diffuse ST-segment elevations. CXR negative. Troponin elevated at 14,  and ESR 21. Patient was discussed with on-call ID physician Dr. Duran from ED who felt clinical presentation likely reflects viral etiology rather than bacterial, therefore ASO titer sent and pending.  - Limited TTE 7/10 with EF 45-50%, moderate distal anterior, septal, apical wall hypokinesis and borderline-mild global LV hypokinesia   - Cardiology consulted, planning cMRI  - Telemetry  - Continuing on Colchicine 0.6mg BID  - Trend troponins, CPK  - Continues on Penicillin   - Follow ASO titer, blood cultures  - Monitor fever curve  - Bedrest with bathroom privileges    Mild intermittent asthma  - Albuterol PRN wheeze     Diet: NPO for Medical/Clinical Reasons Except for: Meds    DVT Prophylaxis: Pneumatic Compression Devices  Hardin Catheter: not present  Code Status: Full Code      Disposition Plan   Expected discharge: 2 - 3 days, recommended to prior living arrangement once hemodynamics stable, cleared by cardiology.  Entered: Ronal Shane PA-C 07/10/2019, 2:58  PM     The patient's care was discussed with the Attending Physician, Dr. Tellez, Bedside Nurse, Patient and Patient's Family.    Ronal Shane PA-C  [facility]    ______________________________________________________________________    Chief Complaint   Chest pain    History is obtained from the patient    History of Present Illness   Barak Urrutia is a 18 year old male with a past medical history of exercise induced asthma and recent diagnosis of strep pharyngitis who presented to the Emergency Department at [facility] for evaluation of chest pain and shortness of breath. Patient was seen by his pediatrician in clinic two days prior to evaluation on 7/8/2019 after presenting with complaint of fever, sore throat, generalized malaise, sinus and nasal congestion. A rapid strep was obtained in clinic and positive, patient was started on amoxicillin for strep pharyngitis.     Patient reports that after he started the amoxicillin. He has not noticed significant improvement in symptoms. Reports ongoing generalized malaise with fevers. Also reports mild semi-productive cough of white sputum. Beginning last evening. Patient notice mild wheeze and uses his prescribed albuterol inhaler with improvement. He subsequently awoke at 3 AM with severe sharp chest pain and shortness of breath. This prompted him to present to [facility] emergency Department for further evaluation.    Upon arrival in emergency department, patient was seen by Dr. Steven. He was noted to be tachycardic with otherwise stable vital signs. EKG was obtained which indicated diffuse ST segment elevation consistent with pericarditis. Chest x-ray was obtained and negative.  Basic laboratory studies included CBC and BMP, which were unremarkable. Troponin was drawn and was significantly elevated at 14. CRP was elevated at 114 and ESR was 21. Blood cultures ×2 were drawn. Given concern of myopericarditis. Patient was discussed  with on-call infectious disease physician who indicated. Etiology is most likely viral with a falsely positive rapid strep. Therefore, ASO titer was sent Additionally, patient was discussed with on-call cardiology, who recommended admission and limited bedside echocardiogram. This is obtained and indicated a reduced ejection fraction. Patient was admitted at [facility] for further management of myopericarditis.    Patient was started on colchicine 0.6 mg twice daily with improvement in symptoms following formal evaluation by cardiology. A review of echocardiogram. He is recommended to transfer to [facility] for cardiac MRI imaging and further treatment. Repeat troponin was up trending with a value of 46. Patient is presently evaluated in hospital room with aunt at bedside. He remains chest pain-free and asymptomatic.     Review of Systems    The 10 point Review of Systems is negative other than noted in the HPI or here.     Past Medical History    I have reviewed this patient's medical history and updated it with pertinent information if needed.   No past medical history on file.    Past Surgical History   I have reviewed this patient's surgical history and updated it with pertinent information if needed.  No past surgical history on file.    Social History   I have reviewed this patient's social history and updated it with pertinent information if needed.  Social History     Tobacco Use     Smoking status: Not on file   Substance Use Topics     Alcohol use: Not on file     Drug use: Not on file       Family History   I have reviewed this patient's family history and updated it with pertinent information if needed.   No family history on file.    Prior to Admission Medications   Prior to Admission Medications   Prescriptions Last Dose Informant Patient Reported? Taking?   acetaminophen (TYLENOL) 500 MG tablet   Yes Yes   Sig: Take 1,000 mg by mouth every 6 hours as needed for mild pain    albuterol (PROAIR HFA/PROVENTIL HFA/VENTOLIN HFA) 108 (90 Base) MCG/ACT inhaler   Yes Yes   Sig: Inhale 1-2 puffs into the lungs every 6 hours as needed for shortness of breath / dyspnea or wheezing   colchicine (COLCYRS) 0.6 MG tablet 7/10/2019 at am  No Yes   Sig: Take 1 tablet (0.6 mg) by mouth 2 times daily   ibuprofen (ADVIL/MOTRIN) 200 MG tablet   Yes Yes   Sig: Take 400 mg by mouth every 6 hours as needed for mild pain   penicillin V (VEETID) 500 MG tablet 7/10/2019 at am  Yes Yes   Sig: Take 500 mg by mouth 2 times daily      Facility-Administered Medications: None     Allergies   No Known Allergies    Physical Exam   Vital Signs: Temp: 98.7  F (37.1  C) Temp src: Oral BP: 107/58   Heart Rate: 85 Resp: 20        Weight: 0 lbs 0 oz    GEN: well-developed, well-nourished, appears comfortable  HEENT: NCAT, PERRL, EOM intact bilaterally, sclera clear, conjunctiva normal, nose & mouth patent, mucous membranes moist  CHEST: lungs CTA bilaterally, no increased work of breathing, no wheeze, rales, rhonchi  HEART: RRR, S1 & S2, no murmur, faint rub appreciated  ABD: soft, nontender, nondistended, no guarding or rigidity, +BS in all 4 quadrants  MSK: full AROM bilateral UE/LE, pedal & radial pulses 2+ bilaterally  NEURO: awake, alert, oriented to name, place, and time. CN II-XII grossly intact. Sensory grossly intact. Muscle strength 5/5 bilaterally  SKIN: warm & dry without rash, no pedal edema    Data   Data reviewed today: I reviewed all medications, new labs and imaging results over the last 24 hours. I personally reviewed no images or EKG's today.    Recent Labs   Lab 07/10/19  1113 07/10/19  0618   WBC  --  11.0   HGB  --  15.8   MCV  --  86   PLT  --  257   NA  --  136   POTASSIUM  --  3.4   CHLORIDE  --  104   CO2  --  23   BUN  --  14   CR  --  1.01   ANIONGAP  --  9   SIGIFREDO  --  9.7   GLC  --  124*   TROPI 46.714* 14.056*     Recent Results (from the past 24 hour(s))   Chest XR,  PA & LAT    Narrative    XR  CHEST 2 VW   7/10/2019 6:49 AM     HISTORY: Chest pain. Pericarditis.    COMPARISON: None.    FINDINGS: The heart size is normal. The lungs are clear. No  pneumothorax or pleural effusion.      Impression    IMPRESSION: No acute abnormality.    MADY KEYES MD

## 2019-07-11 ENCOUNTER — APPOINTMENT (OUTPATIENT)
Dept: CARDIOLOGY | Facility: CLINIC | Age: 19
DRG: 315 | End: 2019-07-11
Attending: NURSE PRACTITIONER
Payer: COMMERCIAL

## 2019-07-11 LAB
ASO AB SERPL-ACNC: 167 IU/ML (ref 0–120)
CREAT SERPL-MCNC: 0.89 MG/DL (ref 0.66–1.25)
CREAT SERPL-MCNC: 0.94 MG/DL (ref 0.66–1.25)
CRP SERPL-MCNC: 78.7 MG/L (ref 0–8)
GFR SERPL CREATININE-BSD FRML MDRD: >90 ML/MIN/{1.73_M2}
GFR SERPL CREATININE-BSD FRML MDRD: >90 ML/MIN/{1.73_M2}
TROPONIN I SERPL-MCNC: 32.2 UG/L (ref 0–0.04)
TROPONIN I SERPL-MCNC: 51.23 UG/L (ref 0–0.04)

## 2019-07-11 PROCEDURE — 36415 COLL VENOUS BLD VENIPUNCTURE: CPT | Performed by: NURSE PRACTITIONER

## 2019-07-11 PROCEDURE — 21000001 ZZH R&B HEART CARE

## 2019-07-11 PROCEDURE — 25500064 ZZH RX 255 OP 636: Performed by: INTERNAL MEDICINE

## 2019-07-11 PROCEDURE — 25000132 ZZH RX MED GY IP 250 OP 250 PS 637: Performed by: INTERNAL MEDICINE

## 2019-07-11 PROCEDURE — 75574 CT ANGIO HRT W/3D IMAGE: CPT | Mod: 26 | Performed by: INTERNAL MEDICINE

## 2019-07-11 PROCEDURE — 75561 CARDIAC MRI FOR MORPH W/DYE: CPT | Mod: 26 | Performed by: INTERNAL MEDICINE

## 2019-07-11 PROCEDURE — 82565 ASSAY OF CREATININE: CPT | Performed by: PHYSICIAN ASSISTANT

## 2019-07-11 PROCEDURE — 84484 ASSAY OF TROPONIN QUANT: CPT | Performed by: PHYSICIAN ASSISTANT

## 2019-07-11 PROCEDURE — 25000128 H RX IP 250 OP 636: Performed by: NURSE PRACTITIONER

## 2019-07-11 PROCEDURE — 82565 ASSAY OF CREATININE: CPT | Performed by: NURSE PRACTITIONER

## 2019-07-11 PROCEDURE — 75561 CARDIAC MRI FOR MORPH W/DYE: CPT

## 2019-07-11 PROCEDURE — 86140 C-REACTIVE PROTEIN: CPT | Performed by: PHYSICIAN ASSISTANT

## 2019-07-11 PROCEDURE — 99233 SBSQ HOSP IP/OBS HIGH 50: CPT | Performed by: PHYSICIAN ASSISTANT

## 2019-07-11 PROCEDURE — 99232 SBSQ HOSP IP/OBS MODERATE 35: CPT | Mod: 25 | Performed by: INTERNAL MEDICINE

## 2019-07-11 PROCEDURE — 75574 CT ANGIO HRT W/3D IMAGE: CPT

## 2019-07-11 PROCEDURE — 25800030 ZZH RX IP 258 OP 636: Performed by: INTERNAL MEDICINE

## 2019-07-11 PROCEDURE — A9585 GADOBUTROL INJECTION: HCPCS | Performed by: INTERNAL MEDICINE

## 2019-07-11 PROCEDURE — 36415 COLL VENOUS BLD VENIPUNCTURE: CPT | Performed by: PHYSICIAN ASSISTANT

## 2019-07-11 RX ORDER — METOPROLOL TARTRATE 1 MG/ML
5-15 INJECTION, SOLUTION INTRAVENOUS
Status: DISCONTINUED | OUTPATIENT
Start: 2019-07-11 | End: 2019-07-13 | Stop reason: HOSPADM

## 2019-07-11 RX ORDER — METOPROLOL TARTRATE 25 MG/1
25 TABLET, FILM COATED ORAL 2 TIMES DAILY
Status: DISCONTINUED | OUTPATIENT
Start: 2019-07-11 | End: 2019-07-11

## 2019-07-11 RX ORDER — DIPHENHYDRAMINE HCL 25 MG
25 CAPSULE ORAL
Status: DISCONTINUED | OUTPATIENT
Start: 2019-07-11 | End: 2019-07-13 | Stop reason: HOSPADM

## 2019-07-11 RX ORDER — METOPROLOL TARTRATE 50 MG
50-100 TABLET ORAL
Status: COMPLETED | OUTPATIENT
Start: 2019-07-11 | End: 2019-07-11

## 2019-07-11 RX ORDER — GADOBUTROL 604.72 MG/ML
5-65 INJECTION INTRAVENOUS ONCE
Status: COMPLETED | OUTPATIENT
Start: 2019-07-11 | End: 2019-07-11

## 2019-07-11 RX ORDER — NITROGLYCERIN 0.4 MG/1
0.4 TABLET SUBLINGUAL
Status: DISCONTINUED | OUTPATIENT
Start: 2019-07-11 | End: 2019-07-13 | Stop reason: HOSPADM

## 2019-07-11 RX ORDER — METOPROLOL TARTRATE 50 MG
50-100 TABLET ORAL
Status: DISCONTINUED | OUTPATIENT
Start: 2019-07-11 | End: 2019-07-11

## 2019-07-11 RX ORDER — SODIUM CHLORIDE 9 MG/ML
INJECTION, SOLUTION INTRAVENOUS CONTINUOUS
Status: DISCONTINUED | OUTPATIENT
Start: 2019-07-12 | End: 2019-07-12

## 2019-07-11 RX ORDER — METOPROLOL TARTRATE 50 MG
50-100 TABLET ORAL
Status: DISCONTINUED | OUTPATIENT
Start: 2019-07-11 | End: 2019-07-13 | Stop reason: HOSPADM

## 2019-07-11 RX ORDER — ACYCLOVIR 200 MG/1
0-1 CAPSULE ORAL
Status: DISCONTINUED | OUTPATIENT
Start: 2019-07-11 | End: 2019-07-13 | Stop reason: HOSPADM

## 2019-07-11 RX ORDER — ONDANSETRON 2 MG/ML
4 INJECTION INTRAMUSCULAR; INTRAVENOUS
Status: DISCONTINUED | OUTPATIENT
Start: 2019-07-11 | End: 2019-07-13 | Stop reason: HOSPADM

## 2019-07-11 RX ORDER — IOPAMIDOL 755 MG/ML
500 INJECTION, SOLUTION INTRAVASCULAR ONCE
Status: COMPLETED | OUTPATIENT
Start: 2019-07-11 | End: 2019-07-11

## 2019-07-11 RX ORDER — DIPHENHYDRAMINE HYDROCHLORIDE 50 MG/ML
25-50 INJECTION INTRAMUSCULAR; INTRAVENOUS
Status: DISCONTINUED | OUTPATIENT
Start: 2019-07-11 | End: 2019-07-13 | Stop reason: HOSPADM

## 2019-07-11 RX ORDER — METHYLPREDNISOLONE SODIUM SUCCINATE 125 MG/2ML
125 INJECTION, POWDER, LYOPHILIZED, FOR SOLUTION INTRAMUSCULAR; INTRAVENOUS
Status: DISCONTINUED | OUTPATIENT
Start: 2019-07-11 | End: 2019-07-13 | Stop reason: HOSPADM

## 2019-07-11 RX ADMIN — IBUPROFEN 800 MG: 400 TABLET ORAL at 04:55

## 2019-07-11 RX ADMIN — COLCHICINE 0.6 MG: 0.6 TABLET, FILM COATED ORAL at 08:43

## 2019-07-11 RX ADMIN — COLCHICINE 0.6 MG: 0.6 TABLET, FILM COATED ORAL at 20:50

## 2019-07-11 RX ADMIN — IOPAMIDOL 120 ML: 755 INJECTION, SOLUTION INTRAVENOUS at 13:23

## 2019-07-11 RX ADMIN — PANTOPRAZOLE SODIUM 40 MG: 40 TABLET, DELAYED RELEASE ORAL at 08:43

## 2019-07-11 RX ADMIN — IBUPROFEN 800 MG: 400 TABLET ORAL at 22:15

## 2019-07-11 RX ADMIN — PENICILLIN V POTASSIUM 500 MG: 500 TABLET, FILM COATED ORAL at 20:50

## 2019-07-11 RX ADMIN — TRAMADOL HYDROCHLORIDE 50 MG: 50 TABLET, COATED ORAL at 08:43

## 2019-07-11 RX ADMIN — SODIUM CHLORIDE: 9 INJECTION, SOLUTION INTRAVENOUS at 08:44

## 2019-07-11 RX ADMIN — TRAMADOL HYDROCHLORIDE 50 MG: 50 TABLET, COATED ORAL at 17:57

## 2019-07-11 RX ADMIN — IBUPROFEN 800 MG: 400 TABLET ORAL at 11:56

## 2019-07-11 RX ADMIN — PANTOPRAZOLE SODIUM 40 MG: 40 TABLET, DELAYED RELEASE ORAL at 17:57

## 2019-07-11 RX ADMIN — TRAMADOL HYDROCHLORIDE 50 MG: 50 TABLET, COATED ORAL at 02:00

## 2019-07-11 RX ADMIN — PENICILLIN V POTASSIUM 500 MG: 500 TABLET, FILM COATED ORAL at 08:43

## 2019-07-11 RX ADMIN — SODIUM CHLORIDE 100 ML: 9 INJECTION, SOLUTION INTRAVENOUS at 13:23

## 2019-07-11 RX ADMIN — GADOBUTROL 10 ML: 604.72 INJECTION INTRAVENOUS at 15:40

## 2019-07-11 RX ADMIN — METOPROLOL TARTRATE 50 MG: 50 TABLET ORAL at 11:59

## 2019-07-11 RX ADMIN — IBUPROFEN 800 MG: 400 TABLET ORAL at 17:57

## 2019-07-11 RX ADMIN — NITROGLYCERIN 0.4 MG: 0.4 TABLET SUBLINGUAL at 13:10

## 2019-07-11 ASSESSMENT — ACTIVITIES OF DAILY LIVING (ADL)
ADLS_ACUITY_SCORE: 11

## 2019-07-11 NOTE — PROGRESS NOTES
[facility]  Cardiology Progress Note  Date of Service: 07/11/2019    Assessment & Plan    Barak Urrutia is a 18 year old male with past medical history significant for seasonal allergies and a recent URI and was diagnosed with strep pharyngitis on 7/8/19 and started on antibiotics. He presented to [facility] 7/10/2019 with chest pain, and he was diagnosed with myopericarditis with mild LV dysfunction. Subsequently, he transferred to [facility] for a cardiac MRI.    Assessment:   1. Myopericarditis with mild LV dysfunction     LVEF was 45-50% with borderline global hypokinesis and moderate RWMA in the anteroseptal and apical areas    Cardiac MRI will look for the extent of inflammation, LV dysfunction and assess for CAD with RWMA noted on echo    Troponin peak 56 and down trending    Sed rate 21 and     Transferred over on colchicine 0.6 mg BID and Ibuprofen 500 mg BID     Increased Ibuprofen to 800 mg QID with PPI coverage (pantoprazole 40 mg BID)    2. Strep pharyngitis    Febrile on 7/8 and started on penicillin    Plan:  1. NPO from 1100 for cardiac MRI today  2. IV Hydration for renal function  3. Add metoprolol 12.5 mg BID  4. CTa angiogram tomorrow with IV hydration prior    DAGOBERTO ROLDAN CNP  Pager:  [phone]   (7am - 5pm, M-F)    Interval History   No further chest pain or sore thoart    Physical Exam   Temp: 97.4  F (36.3  C) Temp src: Oral BP: 105/66   Heart Rate: 71 Resp: 16 SpO2: 98 % O2 Device: None (Room air)    Vitals:    07/11/19 0309   Weight: 71.2 kg (156 lb 14.4 oz)       Constitutional:   NAD   Skin:   Warm and dry   Head:   Nontraumatic   Neck:   no JVD   Lungs:   symmetric, clear to auscultation   Cardiovascular:   regular rate and rhythm and normal S1 and S2   Abdomen:   Benign   Extremities and Back:   No edema   Neurological:   Grossly nonfocal       Medications     sodium chloride 125 mL/hr at 07/11/19 0844       colchicine  0.6 mg Oral BID      ibuprofen  800 mg Oral Q6H     pantoprazole  40 mg Oral BID AC     penicillin V  500 mg Oral BID     sodium chloride (PF)  3 mL Intracatheter Q8H     traMADol  50 mg Oral TID       Data     Most Recent 3 CBC's:  Recent Labs   Lab Test 07/10/19  0618   WBC 11.0   HGB 15.8   MCV 86        Most Recent 3 BMP's:  Recent Labs   Lab Test 07/11/19 0542 07/10/19  0618   NA  --  136   POTASSIUM  --  3.4   CHLORIDE  --  104   CO2  --  23   BUN  --  14   CR 0.94 1.01   ANIONGAP  --  9   SIGIFREDO  --  9.7   GLC  --  124*     Most Recent 3 Troponin's:  Recent Labs   Lab Test 07/11/19  0542 07/10/19  2344 07/10/19  1744   TROPI 32.196* 51.225* 30.913*     Most Recent D-dimer:No lab results found.  Most Recent Cholesterol Panel:No lab results found.  Most Recent ESR & CRP:  Recent Labs   Lab Test 07/11/19 0542 07/10/19  0618   SED  --  21*   CRP 78.7* 114.0*

## 2019-07-11 NOTE — PROGRESS NOTES
[facility]    Medicine Progress Note - Hospitalist Service       Date of Admission:  7/10/2019    Assessment & Plan   Barak Urrutia is a 18 year old male with a past medical history of exercise induced asthma and recent diagnosis of strep pharyngitis who presented to the Emergency Department at [facility] for evaluation of chest pain and shortness of breath. Patient was identified to have findings consistent with myopericarditis and reduced EF on echocardiogram. Following consultation with Cardiology, he is transferred to [facility] for further treatment.     Myopericarditis  Strep Pharyngitis  Patient was diagnosed with strep pharyngitis on Monday, 7/8/19 and prescribed Amoxicillin. With ongoing fever, generalized malaise, sinus congestion and interval development of sob with chest pain prompting ED visit. EKG obtained and with diffuse ST-segment elevations. CXR negative. Troponin elevated at 14,  and ESR 21. Patient was discussed with on-call ID physician Dr. Duran from ED who felt clinical presentation likely reflects viral etiology rather than bacterial, therefore ASO titer sent and pending. Troponin, CRP down-trending.  - Limited TTE 7/10 with EF 45-50%, moderate distal anterior, septal, apical wall hypokinesis and borderline-mild global LV hypokinesia   - Cardiology consulted, planning cMRI today, started on metoprolol 12.5mg BID, planning CTA tomorrow  - Telemetry  - Continuing on Colchicine 0.6mg BID, Ibuprofen 800mg QID  - GI prophylaxis with BID PPI  - Continues on Penicillin   - Follow ASO titer, blood cultures  - Monitor fever curve  - Bedrest with bathroom privileges     Mild intermittent asthma  - Albuterol PRN wheeze     Diet: NPO per Anesthesia Guidelines for Procedure/Surgery Except for: Meds    DVT Prophylaxis: Pneumatic Compression Devices  Hardin Catheter: not present  Code Status: Full Code      Disposition Plan   Expected discharge: 2 - 3  days, recommended to prior living arrangement once inflammatory markers improving, symptoms improved, cleared by cardiology.  Entered: Ronal Shane PA-C 07/11/2019, 10:55 AM       The patient's care was discussed with the Attending Physician, Dr. Tellez, Bedside Nurse, Patient and Patient's Family.    Ronal Shane PA-C  Hospitalist Service  [facility]    ______________________________________________________________________    Interval History   Pt seen & evaluated with mother at bedside. Feeling well, had one episode of chest discomfort overnight with activity that resolved with oxygen. No further episodes. No acute concerns at this time.    Data reviewed today: I reviewed all medications, new labs and imaging results over the last 24 hours. I personally reviewed no images or EKG's today.    Physical Exam   Vital Signs: Temp: 97.4  F (36.3  C) Temp src: Oral BP: 105/66   Heart Rate: 71 Resp: 16 SpO2: 98 % O2 Device: None (Room air)    Weight: 156 lbs 14.4 oz  GEN: well-developed, well-nourished, appears comfortable  HEENT: NCAT, PERRL, EOM intact bilaterally, sclera clear, conjunctiva normal, nose & mouth patent, mucous membranes moist  CHEST: lungs CTA bilaterally, no increased work of breathing, no wheeze, rales, rhonchi  HEART: RRR, S1 & S2, no murmur, no rub  ABD: soft, nontender, nondistended, no guarding or rigidity, +BS in all 4 quadrants  MSK: full AROM bilateral UE/LE, pedal & radial pulses 2+ bilaterally  SKIN: warm & dry without rash, no pedal edema    Data   Recent Labs   Lab 07/11/19  0542 07/10/19  2344 07/10/19  1744  07/10/19  0618   WBC  --   --   --   --  11.0   HGB  --   --   --   --  15.8   MCV  --   --   --   --  86   PLT  --   --   --   --  257   NA  --   --   --   --  136   POTASSIUM  --   --   --   --  3.4   CHLORIDE  --   --   --   --  104   CO2  --   --   --   --  23   BUN  --   --   --   --  14   CR 0.94  --   --   --  1.01   ANIONGAP  --   --   --   --  9   SIGIFREDO  --    --   --   --  9.7   GLC  --   --   --   --  124*   TROPI 32.196* 51.225* 30.913*   < > 14.056*    < > = values in this interval not displayed.     No results found for this or any previous visit (from the past 24 hour(s)).  Medications     [START ON 7/12/2019] sodium chloride       sodium chloride 125 mL/hr at 07/11/19 0844       colchicine  0.6 mg Oral BID     ibuprofen  800 mg Oral Q6H     metoprolol tartrate  12.5 mg Oral BID     pantoprazole  40 mg Oral BID AC     penicillin V  500 mg Oral BID     sodium chloride (PF)  3 mL Intracatheter Q8H     traMADol  50 mg Oral TID

## 2019-07-11 NOTE — DISCHARGE SUMMARY
Same day discharge    Here with likely myocarditis. Abnormal TTE with EF 45% and global LV hypokinesia. Trop rising fast. Cardiology consulted and recommending [facility] transfer for cMRI and higher level cardiac care.     Jevon Kenny  July 11, 2019

## 2019-07-11 NOTE — PLAN OF CARE
No changes, cMRI, and CTA done. See results. Stable post procedures. Some chest tightness but also wheezy and resolved once he used his home inhaler. CRP & trop trending down. On NSAIDS  for Myocarditis, and Penicilin for strep throat.

## 2019-07-11 NOTE — PLAN OF CARE
A&O x4. Pt reported 1-2 cp, scheduled meds given w/ relief. VSS, on RA. Up independently. Tele shows SR.  Afebrile, Diaphoretic. Mother at Bedside. Plan for Cardiac MRI today, NPO at 0000. Continue to monitor.

## 2019-07-12 LAB
ANION GAP SERPL CALCULATED.3IONS-SCNC: 5 MMOL/L (ref 3–14)
BUN SERPL-MCNC: 13 MG/DL (ref 7–30)
CALCIUM SERPL-MCNC: 9 MG/DL (ref 8.5–10.1)
CHLORIDE SERPL-SCNC: 102 MMOL/L (ref 94–109)
CO2 SERPL-SCNC: 31 MMOL/L (ref 20–32)
CREAT SERPL-MCNC: 0.95 MG/DL (ref 0.66–1.25)
GFR SERPL CREATININE-BSD FRML MDRD: >90 ML/MIN/{1.73_M2}
GLUCOSE SERPL-MCNC: 88 MG/DL (ref 70–99)
POTASSIUM SERPL-SCNC: 4.3 MMOL/L (ref 3.4–5.3)
SODIUM SERPL-SCNC: 138 MMOL/L (ref 133–144)

## 2019-07-12 PROCEDURE — 25000132 ZZH RX MED GY IP 250 OP 250 PS 637: Performed by: INTERNAL MEDICINE

## 2019-07-12 PROCEDURE — 99232 SBSQ HOSP IP/OBS MODERATE 35: CPT | Performed by: INTERNAL MEDICINE

## 2019-07-12 PROCEDURE — 25000128 H RX IP 250 OP 636: Performed by: PHYSICIAN ASSISTANT

## 2019-07-12 PROCEDURE — 25000132 ZZH RX MED GY IP 250 OP 250 PS 637: Performed by: NURSE PRACTITIONER

## 2019-07-12 PROCEDURE — 21000001 ZZH R&B HEART CARE

## 2019-07-12 PROCEDURE — 36415 COLL VENOUS BLD VENIPUNCTURE: CPT | Performed by: PHYSICIAN ASSISTANT

## 2019-07-12 PROCEDURE — 25000128 H RX IP 250 OP 636: Performed by: INTERNAL MEDICINE

## 2019-07-12 PROCEDURE — 80048 BASIC METABOLIC PNL TOTAL CA: CPT | Performed by: PHYSICIAN ASSISTANT

## 2019-07-12 PROCEDURE — 99232 SBSQ HOSP IP/OBS MODERATE 35: CPT | Mod: 25 | Performed by: INTERNAL MEDICINE

## 2019-07-12 RX ORDER — ALBUTEROL SULFATE 90 UG/1
1-2 AEROSOL, METERED RESPIRATORY (INHALATION) EVERY 6 HOURS PRN
Status: DISCONTINUED | OUTPATIENT
Start: 2019-07-12 | End: 2019-07-13 | Stop reason: HOSPADM

## 2019-07-12 RX ORDER — IBUPROFEN 400 MG/1
800 TABLET, FILM COATED ORAL 3 TIMES DAILY
Status: DISCONTINUED | OUTPATIENT
Start: 2019-07-12 | End: 2019-07-13 | Stop reason: HOSPADM

## 2019-07-12 RX ORDER — DOCUSATE SODIUM 100 MG/1
100 CAPSULE, LIQUID FILLED ORAL 2 TIMES DAILY
Status: DISCONTINUED | OUTPATIENT
Start: 2019-07-12 | End: 2019-07-13 | Stop reason: HOSPADM

## 2019-07-12 RX ADMIN — ONDANSETRON 4 MG: 2 INJECTION INTRAMUSCULAR; INTRAVENOUS at 06:51

## 2019-07-12 RX ADMIN — COLCHICINE 0.6 MG: 0.6 TABLET, FILM COATED ORAL at 21:00

## 2019-07-12 RX ADMIN — IBUPROFEN 800 MG: 400 TABLET ORAL at 21:50

## 2019-07-12 RX ADMIN — PANTOPRAZOLE SODIUM 40 MG: 40 TABLET, DELAYED RELEASE ORAL at 08:47

## 2019-07-12 RX ADMIN — METOPROLOL TARTRATE 12.5 MG: 25 TABLET ORAL at 21:00

## 2019-07-12 RX ADMIN — PENICILLIN V POTASSIUM 500 MG: 500 TABLET, FILM COATED ORAL at 21:00

## 2019-07-12 RX ADMIN — DOCUSATE SODIUM 100 MG: 100 CAPSULE, LIQUID FILLED ORAL at 21:00

## 2019-07-12 RX ADMIN — METOPROLOL TARTRATE 12.5 MG: 25 TABLET ORAL at 08:48

## 2019-07-12 RX ADMIN — TRAMADOL HYDROCHLORIDE 50 MG: 50 TABLET, COATED ORAL at 03:27

## 2019-07-12 RX ADMIN — FENTANYL CITRATE 25 MCG: 50 INJECTION, SOLUTION INTRAMUSCULAR; INTRAVENOUS at 06:58

## 2019-07-12 RX ADMIN — TRAMADOL HYDROCHLORIDE 50 MG: 50 TABLET, COATED ORAL at 08:48

## 2019-07-12 RX ADMIN — TRAMADOL HYDROCHLORIDE 50 MG: 50 TABLET, COATED ORAL at 16:44

## 2019-07-12 RX ADMIN — COLCHICINE 0.6 MG: 0.6 TABLET, FILM COATED ORAL at 08:47

## 2019-07-12 RX ADMIN — PANTOPRAZOLE SODIUM 40 MG: 40 TABLET, DELAYED RELEASE ORAL at 16:44

## 2019-07-12 RX ADMIN — TRAMADOL HYDROCHLORIDE 50 MG: 50 TABLET, COATED ORAL at 21:50

## 2019-07-12 RX ADMIN — IBUPROFEN 800 MG: 400 TABLET ORAL at 11:02

## 2019-07-12 RX ADMIN — DOCUSATE SODIUM 100 MG: 100 CAPSULE, LIQUID FILLED ORAL at 11:02

## 2019-07-12 RX ADMIN — PENICILLIN V POTASSIUM 500 MG: 500 TABLET, FILM COATED ORAL at 08:48

## 2019-07-12 ASSESSMENT — ACTIVITIES OF DAILY LIVING (ADL)
ADLS_ACUITY_SCORE: 11

## 2019-07-12 NOTE — PLAN OF CARE
Pt AO, VSS on RA. Denies chest pain, on scheduled Tramadol and Ibu. Tele SR. Up indep. Walking the halls. Regular diet. IV SL. Voiding. Plan to discharge tomorrow. Continue to monitor.

## 2019-07-12 NOTE — PROGRESS NOTES
[facility]    Hospitalist Progress Note    Interval History   - Reports chest pain this morning at rest, now resolved. Took one walk this morning, no chest pain. No sore throat. Headaches this AM resolved as well.  - Family at bedside, questions answered    Assessment & Plan   Summary: Barak Urrutia is a 18 year old male with a past medical history of exercise induced asthma, recent strep pharyngitis, who was admitted from [facility] on 7/10/2019 with myopericarditis.     Myopericarditis  Strep Pharyngitis  Patient was diagnosed with strep pharyngitis on Monday, 7/8/19 and prescribed Amoxicillin. With ongoing fever, generalized malaise, sinus congestion and interval development of sob with chest pain prompting ED visit. EKG obtained and with diffuse ST-segment elevations. CXR negative. Troponin elevated at 14-->51,  and ESR 21. Patient was discussed with on-call ID physician Dr. Duran from ED who felt clinical presentation likely reflects viral etiology rather than bacterial, therefore ASO titer sent, returned 126 which is technically within normal limits for patient's age. Troponin, CRP down-trending. Limited TTE 7/10 with EF 45-50%, moderate distal anterior, septal, apical wall hypokinesis and borderline-mild global LV hypokinesia. Cardiac MRI confirms myopericarditis with EF 43%. CTA no evidence of CAD.  - Appreciate cardiology involvement   - Metoprolol  - Continuing on Colchicine 0.6mg BID, Ibuprofen 800mg QID, wean ibuprofen off over 2 weeks  - GI prophylaxis with BID PPI  - Continues on Penicillin, total 10 day therapy ending 7/17     Mild intermittent asthma  - Albuterol PRN wheeze      DVT Prophylaxis: Pneumatic Compression Devices  Code Status: Full Code  PT/OT: not needed    Disposition: Expected discharge tomorrow to home    Yoni Tellez MD  Text Page  (7am to 6pm)  -Data reviewed today: I reviewed all new labs and imaging results over the last 24 hours.    Physical  Exam   Temp: 98.5  F (36.9  C) Temp src: Oral BP: 107/64   Heart Rate: 91 Resp: 16 SpO2: 99 % O2 Device: None (Room air)    Vitals:    07/11/19 0309 07/12/19 0834   Weight: 71.2 kg (156 lb 14.4 oz) 67.9 kg (149 lb 12.8 oz)     Vital Signs with Ranges  Temp:  [97.6  F (36.4  C)-98.5  F (36.9  C)] 98.5  F (36.9  C)  Heart Rate:  [80-91] 91  Resp:  [16-18] 16  BP: (101-109)/(60-65) 107/64  SpO2:  [97 %-99 %] 99 %  I/O last 3 completed shifts:  In: 390 [P.O.:390]  Out: -   O2 requirements: none    Constitutional: Male in NAD  Cardiovascular: Tachycardic regular rate, normal S1/2, no m/r/g  Respiratory: CTAB  Vascular: No LE pitting edema  Skin/Integumen: No rashes  Neuro/Psych: Appropriate affect and mood. A&Ox3, moves all extremities    Medications     sodium chloride       sodium chloride 125 mL/hr at 07/11/19 0844       colchicine  0.6 mg Oral BID     docusate sodium  100 mg Oral BID     ibuprofen  800 mg Oral Q6H     metoprolol tartrate  12.5 mg Oral BID     pantoprazole  40 mg Oral BID AC     penicillin V  500 mg Oral BID     sodium chloride (PF)  10 mL Intravenous Once     sodium chloride (PF)  3 mL Intracatheter Q8H     traMADol  50 mg Oral TID       Data   Recent Labs   Lab 07/12/19  0535 07/11/19  1809 07/11/19  0542 07/10/19  2344 07/10/19  1744  07/10/19  0618   WBC  --   --   --   --   --   --  11.0   HGB  --   --   --   --   --   --  15.8   MCV  --   --   --   --   --   --  86   PLT  --   --   --   --   --   --  257     --   --   --   --   --  136   POTASSIUM 4.3  --   --   --   --   --  3.4   CHLORIDE 102  --   --   --   --   --  104   CO2 31  --   --   --   --   --  23   BUN 13  --   --   --   --   --  14   CR 0.95 0.89 0.94  --   --   --  1.01   ANIONGAP 5  --   --   --   --   --  9   SIGIFREDO 9.0  --   --   --   --   --  9.7   GLC 88  --   --   --   --   --  124*   TROPI  --   --  32.196* 51.225* 30.913*   < > 14.056*    < > = values in this interval not displayed.       Imaging:   Recent Results (from  the past 24 hour(s))   MRI Cardiac w/contrast    Narrative                                                  [facility]                                                     CMR Report      MRN:                  [id number]                                  Name:             ANGELINE GERMAIN                                  :                  2000                                  Scan Date:   2019 15:27:03                                  Electronically signed by Vargas Hancock  17:17:44    SUMMARY   ==========================================================================================================    Clinical history: Myocarditis    Comparison CMR: None    1. The LV is normal in cavity size and wall thickness. The global systolic function is normal. The LVEF is  43%. There is mild global hypokinesis.    2. The RV is normal in cavity size. The global systolic function is low normal. The RVEF is 51%.     3. Both atria are normal in size.    4. There is no significant valvular disease.     5. Late gadolinium enhancement imaging shows mid myocardial hyperenhancement in distal septal and  anteroseptal region. There is also evidence of extensive subepicardial hyperenhancement in the  inferolateral, mid to distal lateral, basal inferior, distal anterior and apical segments. Collectively,  this pattern of hyperenhancement is consistent with extensive myocarditis.     6. There is trivial pericardial effusion.There is some hyperenhancement of pericardium layering the right  ventricle and inferior segments, suggesting pericarditis.    CONCLUSIONS:   Normal LV size with mildly reduced LV systolic function with mild global hypokinesis. The LV EF is 43%.   The RV global systolic function is low normal. The RVEF is 51%.   There is trivial pericardial effusion. There is some hyperenhancement of pericardium layering the right  ventricle and inferior segments, suggesting pericarditis.  Late gadolinium  enhancement imaging reveals mid myocardial hyperenhancement in distal septal and  anteroseptal region. There is also evidence of extensive subepicardial hyperenhancement extending in mid  myocardial regions, in the inferolateral, mid to distal lateral, basal inferior, distal anterior and apical  segments. Collectively, this pattern of hyperenhancement in context of the clinical presentation suggests  extensive myocarditis.   Findings discussed with Dr Bernal at 5pm today.     CORE EXAM   ==========================================================================================================    MEASUREMENTS   ----------------------------------------------------------------------------------------      VOLUMETRIC ANALYSIS       ----------------------------------------------  .----------------------------------------------------------.                    LV    Reference   RV    Reference    +------+-----------+------+------------+------+------------+   EDV   ml          170   (126-208)   136   (127-227)          ml/m^2     87.6   ()   70.1   ()     ESV   ml           97   (35-80)      66   (38-98)            ml/m^2     50.0   (19-41)    34.0   (21-50)      CO    L/min      4.96              4.76                      L/min/m^2   2.6               2.5                MASS  g           146   (109-186)                            g/m^2      75.3   (59-93)                        SV    ml           73   ()     70   ()           ml/m^2     37.6   (44-68)    36.1   (40-72)      EF    %            43   (57-74)      51   (48-74)     '------+-----------+------+------------+------+------------'            CARDIAC OUTPUT HR:  68 BPM      LV DIMENSIONS       ----------------------------------------------          WALL THICKNESS - ANTEROSEPTAL:  1 cm          WALL THICKNESS - INFEROLATERAL:  0.9 cm          LV ANDRÉS:  4.7 cm         LA DIMENSIONS (LV SYSTOLE)       ----------------------------------------------          DIAMETER:  3.3 cm        AORTIC ROOT DIMENSIONS       ----------------------------------------------          ANNULUS:  3.3 cm        SCAN INFO   ==========================================================================================================    GENERAL   ----------------------------------------------------------------------------------------      CONTRAST AGENT       ----------------------------------------------          TYPE:  Gadavist          VOLUME ADMINISTERED:  10 ml          DOSAGE FOR 0.5M:  0.07 mmol/kg        VITALS       ----------------------------------------------          HEIGHT:  72.00 in          HEIGHT:  182.88 cm          WEIGHT:  159.99 lbs          WEIGHT:  72.57 kgs          BSA:  1.94 m^2        PULSE SEQUENCES       ----------------------------------------------          Single-Shot SSFP, IR GRE - Segmented, T2 Turbo Spin Echo w/fat sat, T1 Turbo Spin Echo w/fat sat,          Single Shot BB AALIYAH w/fat sat, SSFP Cine         SETUP       ----------------------------------------------          REASON(S) FOR SCAN:  Myocardial infiltrative disease (evaluate for), Pericardial Evaluation           ATTENDING PHYSICIAN:  TOD TOBAR      Report generated by Pre[employer], a product of [employer] FIA Formula E

## 2019-07-12 NOTE — PLAN OF CARE
A&O x4. VSS, on RA. Up independently. Tele shows SR.  Afebrile, slightly Diaphoretic. 1 episode of emesis after scheduled ibuprofen at 0645, zofran given IV pain meds given for 5/10 chest pain. Mother at Bedside. Continue to monitor.

## 2019-07-12 NOTE — DISCHARGE INSTRUCTIONS
Patient has home inhaler in [facility]. Please be sure to send home with patient. Thanks!

## 2019-07-12 NOTE — PLAN OF CARE
A&O x4. VSS on RA. C/o 1/10 pain, relieved w/ tramadol and ibuprofen. Tele NSR. Voiding in BR w/out difficulty. Reports no BM in 2 days, colace given. Up independently. Tolerating regular diet. Family at bedside, supportive.

## 2019-07-12 NOTE — PROGRESS NOTES
[facility]  Cardiology Progress Note  Date of Service: 07/12/2019    Assessment & Plan    Barak Urrutai is a 18 year old male with past medical history significant for seasonal allergies and a recent URI and was diagnosed with strep pharyngitis on 7/8/19 and started on antibiotics. He presented to [facility] 7/10/2019 with chest pain, and he was diagnosed with myopericarditis with mild LV dysfunction. Subsequently, he transferred to [facility] for a cardiac MRI.    Assessment:   1. Myopericarditis with mild LV dysfunction     LVEF was 45-50% with borderline global hypokinesis and moderate RWMA in the anteroseptal and apical areas    Cardiac MRI will look for the extent of inflammation, LV dysfunction and assess for CAD with RWMA noted on echo    Troponin peak 56 and down trending    Sed rate 21 and     Transferred over on colchicine 0.6 mg BID and Ibuprofen 500 mg BID     Increased Ibuprofen to 800 mg QID with PPI coverage (pantoprazole 40 mg BID)    2. Strep pharyngitis    Febrile on 7/8 and started on penicillin    Plan:  1. Ambulate in hallways  2. Hydration  3. Stool softener  4. Attempt to titrate metoprolol as an outpatient  5. Likely discharge tomorrow if no further chest pain or arrhythmias    [facility] Follow up:  7/18/19: DAGOBERTO Worley, CNP at [facility]  8/12/19: Lab, Zio Patch and cMRI at [facility]  8/20/19: Dr. Bernal at [facility]    DAGOBERTO ROLDAN CNP  Pager:  [phone]   (7am - 5pm, M-F)    Interval History   No further chest pain or sore throat. He has not had a bowel moment since Wednesday.     Physical Exam   Temp: 98.5  F (36.9  C) Temp src: Oral BP: 107/64 Pulse: 93 Heart Rate: 91 Resp: 16 SpO2: 99 % O2 Device: None (Room air)    Vitals:    07/11/19 0309 07/12/19 0834   Weight: 71.2 kg (156 lb 14.4 oz) 67.9 kg (149 lb 12.8 oz)       Constitutional:   NAD   Skin:   Warm and dry   Head:   Nontraumatic   Neck:   no JVD   Lungs:   symmetric, clear to  auscultation   Cardiovascular:   regular rate and rhythm and normal S1 and S2   Abdomen:   Benign   Extremities and Back:   No edema   Neurological:   Grossly nonfocal       Medications     sodium chloride       sodium chloride 125 mL/hr at 07/11/19 0844       colchicine  0.6 mg Oral BID     ibuprofen  800 mg Oral Q6H     metoprolol tartrate  12.5 mg Oral BID     pantoprazole  40 mg Oral BID AC     penicillin V  500 mg Oral BID     sodium chloride (PF)  10 mL Intravenous Once     sodium chloride (PF)  3 mL Intracatheter Q8H     traMADol  50 mg Oral TID       Data     Most Recent 3 CBC's:  Recent Labs   Lab Test 07/10/19  0618   WBC 11.0   HGB 15.8   MCV 86        Most Recent 3 BMP's:  Recent Labs   Lab Test 07/12/19  0535 07/11/19  1809 07/11/19  0542 07/10/19  0618     --   --  136   POTASSIUM 4.3  --   --  3.4   CHLORIDE 102  --   --  104   CO2 31  --   --  23   BUN 13  --   --  14   CR 0.95 0.89 0.94 1.01   ANIONGAP 5  --   --  9   SIGIFREDO 9.0  --   --  9.7   GLC 88  --   --  124*     Most Recent 3 Troponin's:  Recent Labs   Lab Test 07/11/19  0542 07/10/19  2344 07/10/19  1744   TROPI 32.196* 51.225* 30.913*     Most Recent D-dimer:No lab results found.  Most Recent Cholesterol Panel:No lab results found.  Most Recent ESR & CRP:  Recent Labs   Lab Test 07/11/19  0542 07/10/19  0618   SED  --  21*   CRP 78.7* 114.0*

## 2019-07-13 VITALS
DIASTOLIC BLOOD PRESSURE: 58 MMHG | SYSTOLIC BLOOD PRESSURE: 98 MMHG | OXYGEN SATURATION: 100 % | WEIGHT: 160.4 LBS | HEART RATE: 93 BPM | RESPIRATION RATE: 16 BRPM | TEMPERATURE: 98 F

## 2019-07-13 PROCEDURE — 99238 HOSP IP/OBS DSCHRG MGMT 30/<: CPT | Performed by: INTERNAL MEDICINE

## 2019-07-13 PROCEDURE — 25000132 ZZH RX MED GY IP 250 OP 250 PS 637: Performed by: INTERNAL MEDICINE

## 2019-07-13 PROCEDURE — 99232 SBSQ HOSP IP/OBS MODERATE 35: CPT | Performed by: INTERNAL MEDICINE

## 2019-07-13 PROCEDURE — 25000132 ZZH RX MED GY IP 250 OP 250 PS 637: Performed by: NURSE PRACTITIONER

## 2019-07-13 RX ORDER — METOPROLOL SUCCINATE 25 MG/1
25 TABLET, EXTENDED RELEASE ORAL DAILY
Qty: 30 TABLET | Refills: 0 | Status: SHIPPED | OUTPATIENT
Start: 2019-07-13 | End: 2019-08-08

## 2019-07-13 RX ORDER — PENICILLIN V POTASSIUM 500 MG/1
500 TABLET, FILM COATED ORAL 2 TIMES DAILY
Qty: 8 TABLET | Refills: 0 | Status: SHIPPED | OUTPATIENT
Start: 2019-07-13 | End: 2019-07-17

## 2019-07-13 RX ORDER — IBUPROFEN 800 MG/1
800 TABLET, FILM COATED ORAL 3 TIMES DAILY
Qty: 42 TABLET | Refills: 0 | Status: SHIPPED | OUTPATIENT
Start: 2019-07-13 | End: 2019-08-20

## 2019-07-13 RX ORDER — PANTOPRAZOLE SODIUM 40 MG/1
40 TABLET, DELAYED RELEASE ORAL
Qty: 28 TABLET | Refills: 0 | Status: SHIPPED | OUTPATIENT
Start: 2019-07-13 | End: 2019-07-13

## 2019-07-13 RX ORDER — PANTOPRAZOLE SODIUM 40 MG/1
40 TABLET, DELAYED RELEASE ORAL
Qty: 28 TABLET | Refills: 0 | Status: SHIPPED | OUTPATIENT
Start: 2019-07-13 | End: 2019-08-20

## 2019-07-13 RX ORDER — COLCHICINE 0.6 MG/1
0.6 TABLET ORAL 2 TIMES DAILY
Qty: 60 TABLET | Refills: 0 | Status: SHIPPED | OUTPATIENT
Start: 2019-07-13 | End: 2019-08-08

## 2019-07-13 RX ADMIN — TRAMADOL HYDROCHLORIDE 50 MG: 50 TABLET, COATED ORAL at 09:01

## 2019-07-13 RX ADMIN — PENICILLIN V POTASSIUM 500 MG: 500 TABLET, FILM COATED ORAL at 09:01

## 2019-07-13 RX ADMIN — COLCHICINE 0.6 MG: 0.6 TABLET, FILM COATED ORAL at 09:01

## 2019-07-13 RX ADMIN — IBUPROFEN 800 MG: 400 TABLET ORAL at 09:01

## 2019-07-13 RX ADMIN — PANTOPRAZOLE SODIUM 40 MG: 40 TABLET, DELAYED RELEASE ORAL at 07:58

## 2019-07-13 RX ADMIN — METOPROLOL TARTRATE 12.5 MG: 25 TABLET ORAL at 09:01

## 2019-07-13 ASSESSMENT — ACTIVITIES OF DAILY LIVING (ADL)
ADLS_ACUITY_SCORE: 11

## 2019-07-13 NOTE — PLAN OF CARE
A&OX4,VSS, denies SOB/CP, on room air.Tele- SR. On regular diet, up independent. Family at bedside.

## 2019-07-13 NOTE — PROGRESS NOTES
[facility].  Inpatient Cardiology Progress Note.  Date of Service: 7/13/2019       INTERVAL HISTORY:  Pain-free on a combination of colchicine 0.6 mg twice daily, ibuprofen 800 mg 3 times daily (with PPI coverage) and tramadol 50 mg 3 times daily.  No arrhythmias. He is also getting penicillin V for his Streptococcus pharyngitis and low-dose metoprolol 12.5 mg twice daily for myocarditis burden and mildly reduced LVEF of 47%.    On exam - regular heart sounds, no pericardial friction rub or murmur.  Lungs are clear.  Alert and oriented.    Labs - creatinine 0.95.  Peak troponin I 51.2, subsequently down trended to 32.  Hemoglobin 15.8.  Platelets 257    DIAGNOSES:  1.  Acute myopericarditis.  2.  Mild left ventricular systolic adenopathy, LVEF 47%, secondary to #1.  3.  Streptococcal pharyngitis.    ASSESSMENT/PLAN:  1.  Discharge to home today.  2.  Outpatient cardiology follow-up and imaging has been scheduled.  3.  Discharge medications:  - Ibuprofen 800 mg 3 times daily for another 5 days, taper down to 400 mg 3 times daily for 5 days, then twice daily and stop.  If he gets recurrent pain, consider restarting.  - Continue colchicine 0.6 mg daily for a minimum of 3 to 6 months.  I will supervise this at follow-up.  - PPI coverage for NSAID.  - Tramadol 50 mg 3 times daily for 5 days, and then as needed.  - Duration of penicillin therapy per hospitalist service.  - Continue metoprolol tartrate 12.5 mg twice daily.  4.  Exercise restrictions discussed.    Cardiology will sign off.  Thank you for consulting us.    Shavon Bernal MD [facility]  Cardiology.        VITAL SIGNS:  Temp: 98.1  F (36.7  C) Temp src: Oral BP: 96/44   Heart Rate: 86 Resp: 16 SpO2: 94 % O2 Device: None (Room air)    07/08 0700 - 07/13 0659  In: 640 [P.O.:640]  Out: -   Net: 640  Vitals:    07/11/19 0309 07/12/19 0834 07/13/19 0117   Weight: 71.2 kg (156 lb 14.4 oz) 67.9 kg (149 lb 12.8 oz) 72.8 kg (160 lb 6.4 oz)

## 2019-07-13 NOTE — PLAN OF CARE
VSS. Monitor remains Sinus rhythm. Pt. Discharged to home with parents. Discharge instructions reviewed with pt. And   Parents. Discharge medications sent with pt.

## 2019-07-13 NOTE — DISCHARGE SUMMARY
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           Unresulted Labs Ordered in the Past 30 Days of this Admission     Date and Time Order Name Status Description    7/10/2019 0626 Blood culture Preliminary     7/10/2019 0626 Blood culture Preliminary       These results will be followed up by cardiology clinic    Hospital Course   Barak Urrutia is a 18 year old male with a past medical history of exercise induced asthma, recent strep pharyngitis, who was admitted from [facility] on 7/10/2019 with myopericarditis. Patient was diagnosed with strep pharyngitis on Monday, 7/8/19 and prescribed Amoxicillin. With ongoing fever, generalized malaise, sinus congestion and interval development of SOB with chest pain prompting ED visit. EKG noted diffuse ST-segment elevations. CXR negative. Troponin elevated at 14-->51,  and ESR 21. Patient was discussed with on-call ID physician Dr. Duran from ED who felt clinical presentation likely reflects viral etiology rather than bacterial, therefore ASO titer sent, returned 126 which is technically within normal limits for patient's age. Troponin, CRP down-trending. Limited TTE 7/10 with EF 45-50%, moderate distal anterior, septal, apical wall hypokinesis and borderline-mild global LV hypokinesia. Cardiac MRI confirms myopericarditis with EF 43%. CTA no evidence of CAD.  He will be following primarily with the cardiology clinic for his myopericarditis.  - Continuing on Colchicine 0.6mg BID, Ibuprofen 800mg QID, wean ibuprofen off over 2 weeks  - GI prophylaxis with BID PPI while on ibuprofen  -  Continues on Penicillin, total 10 day therapy ending 7/17     Consultations This Hospital Stay   CARDIOLOGY IP CONSULT    Code Status   Full Code    Time Spent on this Encounter   I, Yoni Tellez, personally saw the patient today and spent approximately 25 minutes discharging this patient.       Yoni Tellez MD  [facility]  ______________________________________________________________________    Physical Exam   Vital Signs: Temp: 98.1  F (36.7  C) Temp src: Oral BP: 104/59   Heart Rate: 86 Resp: 16 SpO2: 94 % O2 Device: None (Room air)    Weight: 160 lbs 6.4 oz    Constitutional: Male in NAD  HEENT: Normocephalic, atraumatic, oral mucosa moist  Respiratory: CTAB, no wheezing or crackles  Cardiovascular: RRR, normal S1/2, no m/r/g  GI: No organomegaly, normoactive bowel sounds, nontender, nondistended  Skin: No rashes or scars  Musculoskeletal: Normal strength in UE and LE, moves all extremities  Neurologic: A&Ox3  Psychiatric: Appropriate affect and mood       Primary Care Physician   Kendrick Montero    Discharge Disposition   Discharged to home  Condition at discharge: Stable    Significant Results and Procedures   Most Recent 3 CBC's:  Recent Labs   Lab Test 07/10/19  0618   WBC 11.0   HGB 15.8   MCV 86        Most Recent 3 BMP's:  Recent Labs   Lab Test 07/12/19  0535 07/11/19  1809 07/11/19  0542 07/10/19  0618     --   --  136   POTASSIUM 4.3  --   --  3.4   CHLORIDE 102  --   --  104   CO2 31  --   --  23   BUN 13  --   --  14   CR 0.95 0.89 0.94 1.01   ANIONGAP 5  --   --  9   SIGIFREDO 9.0  --   --  9.7   GLC 88  --   --  124*     Most Recent 2 LFT's:No lab results found.  Most Recent 3 Troponin's:  Recent Labs   Lab Test 07/11/19  0542 07/10/19  2344 07/10/19  1744   TROPI 32.196* 51.225* 30.913*     Most Recent 3 BNP's:No lab results found.,   Results for orders placed or performed during the hospital encounter of 07/10/19   MRI Cardiac w/contrast    Narrative                                                   [facility]                                                     CMR Report      MRN:                  [id number]                                  Name:             ANGELINE GERMAIN                                  :                  2000                                  Scan Date:   2019 15:27:03                                  Electronically signed by Vargas Hancock  17:17:44    SUMMARY   ==========================================================================================================    Clinical history: Myocarditis    Comparison CMR: None    1. The LV is normal in cavity size and wall thickness. The global systolic function is normal. The LVEF is  43%. There is mild global hypokinesis.    2. The RV is normal in cavity size. The global systolic function is low normal. The RVEF is 51%.     3. Both atria are normal in size.    4. There is no significant valvular disease.     5. Late gadolinium enhancement imaging shows mid myocardial hyperenhancement in distal septal and  anteroseptal region. There is also evidence of extensive subepicardial hyperenhancement in the  inferolateral, mid to distal lateral, basal inferior, distal anterior and apical segments. Collectively,  this pattern of hyperenhancement is consistent with extensive myocarditis.     6. There is trivial pericardial effusion.There is some hyperenhancement of pericardium layering the right  ventricle and inferior segments, suggesting pericarditis.    CONCLUSIONS:   Normal LV size with mildly reduced LV systolic function with mild global hypokinesis. The LV EF is 43%.   The RV global systolic function is low normal. The RVEF is 51%.   There is trivial pericardial effusion. There is some hyperenhancement of pericardium layering the right  ventricle and inferior segments, suggesting pericarditis.  Late gadolinium enhancement imaging reveals mid myocardial hyperenhancement in distal  septal and  anteroseptal region. There is also evidence of extensive subepicardial hyperenhancement extending in mid  myocardial regions, in the inferolateral, mid to distal lateral, basal inferior, distal anterior and apical  segments. Collectively, this pattern of hyperenhancement in context of the clinical presentation suggests  extensive myocarditis.   Findings discussed with Dr Bernal at 5pm today.     CORE EXAM   ==========================================================================================================    MEASUREMENTS   ----------------------------------------------------------------------------------------      VOLUMETRIC ANALYSIS       ----------------------------------------------  .----------------------------------------------------------.                    LV    Reference   RV    Reference    +------+-----------+------+------------+------+------------+   EDV   ml          170   (126-208)   136   (127-227)          ml/m^2     87.6   ()   70.1   ()     ESV   ml           97   (35-80)      66   (38-98)            ml/m^2     50.0   (19-41)    34.0   (21-50)      CO    L/min      4.96              4.76                      L/min/m^2   2.6               2.5                MASS  g           146   (109-186)                            g/m^2      75.3   (59-93)                        SV    ml           73   ()     70   ()           ml/m^2     37.6   (44-68)    36.1   (40-72)      EF    %            43   (57-74)      51   (48-74)     '------+-----------+------+------------+------+------------'            CARDIAC OUTPUT HR:  68 BPM      LV DIMENSIONS       ----------------------------------------------          WALL THICKNESS - ANTEROSEPTAL:  1 cm          WALL THICKNESS - INFEROLATERAL:  0.9 cm          LV ANDRÉS:  4.7 cm        LA DIMENSIONS (LV SYSTOLE)        ----------------------------------------------          DIAMETER:  3.3 cm        AORTIC ROOT DIMENSIONS       ----------------------------------------------          ANNULUS:  3.3 cm        SCAN INFO   ==========================================================================================================    GENERAL   ----------------------------------------------------------------------------------------      CONTRAST AGENT       ----------------------------------------------          TYPE:  Gadavist          VOLUME ADMINISTERED:  10 ml          DOSAGE FOR 0.5M:  0.07 mmol/kg        VITALS       ----------------------------------------------          HEIGHT:  72.00 in          HEIGHT:  182.88 cm          WEIGHT:  159.99 lbs          WEIGHT:  72.57 kgs          BSA:  1.94 m^2        PULSE SEQUENCES       ----------------------------------------------          Single-Shot SSFP, IR GRE - Segmented, T2 Turbo Spin Echo w/fat sat, T1 Turbo Spin Echo w/fat sat,          Single Shot BB AALIYAH w/fat sat, SSFP Cine         SETUP       ----------------------------------------------          REASON(S) FOR SCAN:  Myocardial infiltrative disease (evaluate for), Pericardial Evaluation           ATTENDING PHYSICIAN:  TOD TOBAR      Report generated by [employer], a product of [employer]   CT Angiogram coronary artery    Narrative    Procedure: CTA ANGIOGRAM CORONARY ARTERY   Examination Date: 7/11/2019 1:36 PM     Indication:  Myocarditis.     Clinical Information: Myocarditis   Ordering Physician: Jessica Mercedes NP     Overall quality of the study: Excellent.     PROCEDURE:The patient was positioned in the scanner gantry and an IV  was started using an 18 gauge IV in the right antecubital fossa.   Utilizing 120 cc  Isovue 370, wasted 0 cc, multi-slice computed  tomography was performed with a Siemens Dual Source Flash scanner  without incident. Beta-blockers were required to optimize heart rate,  patient was  given Metoprolol 50 mg Oral, Metoprolol 0 mg  IV. The  patient was given pre-medication of sublingual Nitrostat 0.4 mg prior  to scanning. Coronary artery calcium score was performed using the  Flash scanner protocol. CTA was performed in the spiral mode at a  heart rate of 85 bpm with 100 kVp. Images were reconstructed and  analyzed on a [employer] workstation. Scan protocol was optimized to  minimize radiation exposure. The total radiation exposure including  calcium score was calculated to be 277 DLP, and 4.07 mSv.      Impression    IMPRESSION:    1. Normal coronary anatomy with no detectable stenosis or plaque.    3.  Please review Radiology report for incidental noncardiac findings  that  will follow separately.        FINDINGS:    CORONARY CT ANGIOGRAPHY    DOMINANCE: Right dominant system.     LEFT MAIN: The left main arises normally from the left coronary cusp  and is widely patent without any stenosis or plaque.    LEFT ANTERIOR DESCENDING: The left anterior descending and its major  diagonal branches are widely patent without any detectable stenosis or  plaque.      CIRCUMFLEX: The circumflex and its major branches are widely patent  without any detectable stenosis or plaque.    RIGHT CORONARY ARTERY: The right coronary artery and its major  branches are widely patent without any detectable stenosis or plaque.    ADDITIONAL FINDINGS:     The proximal ascending aorta is normal in size.     Normal pulmonary venous anatomy with all four pulmonary veins draining  into the left atrium.      There is no left ventricular mass or thrombus.     Normal pericardial thickness. There is no pericardial effusion.    The proximal pulmonary arteries are well opacified.    Please review Radiology report for incidental noncardiac findings that  will follow separately.    SHAMAR DE LUNA MD   Radiologist Consult For Cardiology    Narrative    RADIOLOGIST CONSULT FOR CARDIOLOGY  7/11/2019 1:36 PM     HISTORY: elevated  troponin    COMPARISON: None.    FINDINGS: Only the visualized soft tissues are reviewed on this  report. Please refer to cardiology report for vascular findings.    Visualized soft tissues are unremarkable.    KARLENE BAEZ MD       Discharge Orders      MRI Cardiac w/contrast     Comprehensive metabolic panel     CBC with platelets    Last Lab Result: Hemoglobin (g/dL)       Date                     Value                 07/10/2019               15.8             ----------     CRP inflammation     Erythrocyte sedimentation rate auto     Comprehensive metabolic panel     CBC with platelets    Last Lab Result: Hemoglobin (g/dL)       Date                     Value                 07/10/2019               15.8             ----------     CRP inflammation     Erythrocyte sedimentation rate auto     Discharge Order: F/U with Cardiac  COLLIN      Follow-Up with Cardiologist      Follow-Up with Cardiologist      Reason for your hospital stay    You were hospitalized for myopericarditis.     Activity    Your activity upon discharge: activity as tolerated     When to contact your care team    Call your cardiology clinic if you have any of the following: fever over 100.4F, worsening chest pain not improved with ibuprofen.     Follow-up and recommended labs and tests     Follow up with cardiology as scheduled.     Full Code     Zio Patch Holter Adult Pediatric Greater than 48 hrs     Echocardiogram Complete    Administration of IV contrast will be tailored to this examination per the appropriate written protocol listed in the Echocardiography department Protocol Book, or by the supervising Cardiologist. This may result in an order change.    Use of contrast is at the discretion of the supervising Cardiologist.     Diet    Follow this diet upon discharge:      Regular Diet Adult     Discharge Medications   Current Discharge Medication List      START taking these medications    Details   metoprolol succinate ER (TOPROL-XL) 25  MG 24 hr tablet Take 1 tablet (25 mg) by mouth daily  Qty: 30 tablet, Refills: 0    Associated Diagnoses: Myopericarditis      pantoprazole (PROTONIX) 40 MG EC tablet Take 1 tablet (40 mg) by mouth 2 times daily (before meals) Stop taking when you are no longer taking ibuprofen.  Qty: 28 tablet, Refills: 0    Associated Diagnoses: Myopericarditis         CONTINUE these medications which have CHANGED    Details   colchicine (COLCYRS) 0.6 MG tablet Take 1 tablet (0.6 mg) by mouth 2 times daily  Qty: 60 tablet, Refills: 0    Associated Diagnoses: Myopericarditis      ibuprofen (ADVIL/MOTRIN) 800 MG tablet Take 1 tablet (800 mg) by mouth 3 times daily for 14 days Take less each day once you are chest pain free.  Qty: 42 tablet, Refills: 0    Associated Diagnoses: Myopericarditis      penicillin V (VEETID) 500 MG tablet Take 1 tablet (500 mg) by mouth 2 times daily for 4 days  Qty: 8 tablet, Refills: 0    Associated Diagnoses: Streptococcal pharyngitis         CONTINUE these medications which have NOT CHANGED    Details   acetaminophen (TYLENOL) 500 MG tablet Take 1,000 mg by mouth every 6 hours as needed for mild pain      albuterol (PROAIR HFA/PROVENTIL HFA/VENTOLIN HFA) 108 (90 Base) MCG/ACT inhaler Inhale 1-2 puffs into the lungs every 6 hours as needed for shortness of breath / dyspnea or wheezing           Allergies   No Known Allergies

## 2019-07-16 ENCOUNTER — CARE COORDINATION (OUTPATIENT)
Dept: CARDIOLOGY | Facility: CLINIC | Age: 19
End: 2019-07-16

## 2019-07-16 LAB
BACTERIA SPEC CULT: NO GROWTH
BACTERIA SPEC CULT: NO GROWTH
Lab: NORMAL
Lab: NORMAL
SPECIMEN SOURCE: NORMAL
SPECIMEN SOURCE: NORMAL

## 2019-07-16 NOTE — PROGRESS NOTES
Patient was evaluated by cardiology while inpatient for myocarditis. Called patient to discuss any post hospital d/c questions he may have, review medication changes, and confirm f/u appts. Patient denied any questions regarding new medications or changes to PTA medications. Patient denied any SOB, chest pain, or light headedness. Pt reports having diarrhea but states its tolerable. Advised to call if it get worse and/or intolerable. RN confirmed with patient that he has an apt scheduled on 7/18/19 with LESLEY Chio Herrera. Patient advised to call clinic with any cardiac related questions or concerns prior to this lesley't. Patient verbalized understanding and agreed with plan.

## 2019-07-18 ENCOUNTER — OFFICE VISIT (OUTPATIENT)
Dept: CARDIOLOGY | Facility: CLINIC | Age: 19
End: 2019-07-18
Attending: NURSE PRACTITIONER
Payer: COMMERCIAL

## 2019-07-18 VITALS
HEART RATE: 76 BPM | BODY MASS INDEX: 22.96 KG/M2 | SYSTOLIC BLOOD PRESSURE: 117 MMHG | WEIGHT: 155 LBS | HEIGHT: 69 IN | DIASTOLIC BLOOD PRESSURE: 73 MMHG

## 2019-07-18 DIAGNOSIS — I31.9 MYOPERICARDITIS: ICD-10-CM

## 2019-07-18 DIAGNOSIS — I41: ICD-10-CM

## 2019-07-18 PROCEDURE — 99214 OFFICE O/P EST MOD 30 MIN: CPT | Performed by: NURSE PRACTITIONER

## 2019-07-18 ASSESSMENT — MIFFLIN-ST. JEOR: SCORE: 1703.46

## 2019-07-18 NOTE — PATIENT INSTRUCTIONS
1. Ibuprofen 800 mg 3 times daily for for one more day. then taper down to 400 mg 3 times daily for 5 days, then twice daily for 5 days and stop.  If the pain returns, please call me at [phone].    2. Colchicine 0.6 mg daily for a minimum of 3 to 6 months    3. Continue Protonix for the duration of the Ibuprofen or longer if needed    4. Continue Metoprolol at 12.5 twice daily. I will verify this with Dr. Bernal and call you if she recommends a higher dosage.     5. Labs, MRI and heart monitor placed on August 12 and see Dr. Bernal on August 20

## 2019-07-18 NOTE — PROGRESS NOTES
HPI and Plan:   I had the pleasure of seeing Barak Urrutia today in cardiology clinic follow up. He is a pleasant 18 year old patient of Dr. Bernal with a history of acute myopericarditis, mildly reduced LVEF 47% secondary to #1, and streptococcal pharyngitis.    Mr. Urrutia presented to [facility] emergency department on July 10, 2019 for pharyngitis, cough and shortness of breath.  He was actively being treated for strep throat.  His temperature peaked at 103.  He was started on amoxicillin.      His symptoms persisted and he developed a new onset of chest pain with shortness of breath therefore, his mother took him to the emergency room.  His EKG was concerning for pericarditis with diffuse ST elevation and DC depression.  His troponin was elevated at 14.  His inflammatory markers were also elevated (Sed rate 21 and ).  The echocardiogram demonstrated a reduced left ventricular systolic function with an ejection fraction of 45 to 50%.  There was moderate distal anterior, septal and apical wall hypokinesis. There was borderline to mild global hypokinesia of the LV.  No pericardial effusion. He was started on colchicine and ibuprofen for myopericarditis and transferred to [facility] for a cardiac MRI.     He underwent a cardiac MRI which demonstrated a reduced LVEF at 43% and mild global hypokinesis. The RV function was low, normal at 51%. Late gadolinium enhancement imaging shows extensive myocarditis. There was some hyperenhancement of pericardium layering the right ventricle and inferior segments, suggesting pericarditis. Following the cardiac MRI, the patient's troponin increased to 51. He underwent CT coronary angiogram which demonstrated a normal coronary anatomy with no detectable stenosis or plaque. Due to the myocarditis burden and risk for arrhythmias as well as a mildly reduced LVEF he was initiated on low-dose metoprolol 12.5 mg twice daily.     His discharge medications consisted of:  Ibuprofen 800 mg 3 times daily for another 5 days, taper down to 400 mg 3 times daily for 5 days, then twice daily and stop.  If he gets recurrent pain, consider restarting. Continue colchicine 0.6 mg daily for a minimum of 3 to 6 months. PPI coverage for NSAID. Tramadol 50 mg 3 times daily for 5 days, and then as needed. Continue Metoprolol Tartrate 12.5 mg twice daily.    Today he presents to the cardiology clinic following discharge.  He has been doing well from a cardiac standpoint and remains chest pain-free.  He is taken his colchicine and ibuprofen as scheduled. However, he forgot a dosage of ibuprofen once and experienced a mild discomfort.  This quickly subsided with his next scheduled ibuprofen dose.  He begins the weaning process of his ibuprofen tomorrow.    He states he experienced mild diarrhea after initiating the colchicine however that quickly resolved.  He continues to take the Protonix.  He has completed his penicillin.    He is eager to get back to school however, he states he is bored due to his exercise restriction.  He is scheduled to see Dr. Bernal on August 20, 2019 with a cardiac MRI, inflammatory markers and a ZIO patch prior to his visit.     Physical Exam  Please see Below     Assessment and Plan  1. Acute myopericarditis.  He is doing well without recurrent chest pain.  Continue colchicine for 3 to 6 months.  He begin the ibuprofen taper 07/18 which consists of reducing the ibuprofen to 400 mg (from 800 mg) 3 times daily for 5 days, then twice daily and stop.  I have asked him to call me if he experiences recurrent chest pain during this taper.  He is scheduled to see Dr. Bernal on August 20.  Prior to his visit, he is scheduled to undergo a cardiac MRI, have inflammatory markers drawn and a Zio patch monitor placed on August 12.    He appears to be sad due to his recent diagnosis and exercise restrictions.  We discussed at length today the importance of getting out to see his friends and  family as well as being somewhat active.  I have encouraged him to do things he enjoys in moderation such as, going for a walk outside. I did review this with Dr. Bernal as well.    2. Mildly reduced LV systolic function, EF 47%.  He denies shortness of breath, orthopnea, PND.  Continue current dose of metoprolol tartrate 12.5 mg twice daily.     3. Streptococcal pharyngitis.  Continue duration of penicillin dosage as prescribed.      Thank you for allowing me to care for Barak Urrutia today.    DAGOBERTO Worley, CNP  Cardiology    Voice recognition software was used for this note, I have reviewed this note, but errors may have been missed.    No orders of the defined types were placed in this encounter.    No orders of the defined types were placed in this encounter.    There are no discontinued medications.      CURRENT MEDICATIONS:  Current Outpatient Medications   Medication Sig Dispense Refill     acetaminophen (TYLENOL) 500 MG tablet Take 1,000 mg by mouth every 6 hours as needed for mild pain       albuterol (PROAIR HFA/PROVENTIL HFA/VENTOLIN HFA) 108 (90 Base) MCG/ACT inhaler Inhale 1-2 puffs into the lungs every 6 hours as needed for shortness of breath / dyspnea or wheezing       colchicine (COLCYRS) 0.6 MG tablet Take 1 tablet (0.6 mg) by mouth 2 times daily 60 tablet 0     ibuprofen (ADVIL/MOTRIN) 800 MG tablet Take 1 tablet (800 mg) by mouth 3 times daily for 14 days Take less each day once you are chest pain free. 42 tablet 0     metoprolol succinate ER (TOPROL-XL) 25 MG 24 hr tablet Take 1 tablet (25 mg) by mouth daily 30 tablet 0     pantoprazole (PROTONIX) 40 MG EC tablet Take 1 tablet (40 mg) by mouth 2 times daily (before meals) Stop taking when you are no longer taking ibuprofen. 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 No pertinent surgical history.    FAMILY HISTORY:  History reviewed. No pertinent family history.    SOCIAL HISTORY:  Social History     Socioeconomic History     Marital status: Single     Spouse name: None     Number of children: None     Years of education: None     Highest education level: None   Occupational History     None   Social Needs     Financial resource strain: None     Food insecurity:     Worry: None     Inability: None     Transportation needs:     Medical: None     Non-medical: None   Tobacco Use     Smoking status: Current Every Day Smoker     Smokeless tobacco: Never Used     Tobacco comment: e-cig 1-2 times a day   Substance and Sexual Activity     Alcohol use: Yes     Comment: not right know     Drug use: None     Sexual activity: None   Lifestyle     Physical activity:     Days per week: None     Minutes per session: None     Stress: None   Relationships     Social connections:     Talks on phone: None     Gets together: None     Attends Shinto service: None     Active member of club or organization: None     Attends meetings of clubs or organizations: None     Relationship status: None     Intimate partner violence:     Fear of current or ex partner: None     Emotionally abused: None     Physically abused: None     Forced sexual activity: None   Other Topics Concern     None   Social History Narrative     None       Review of Systems:  Skin:  Negative for       Eyes:  Positive for glasses    ENT:  Negative for      Respiratory:  Positive for cough     Cardiovascular:    Positive for chest dicomfort  Gastroenterology: Negative for      Genitourinary:  not assessed      Musculoskeletal:  Negative for      Neurologic:  Negative for     "  Psychiatric:  Negative for      Heme/Lymph/Imm:  Negative      Endocrine:  Negative        Physical Exam:  Vitals: /73   Pulse 76   Ht 1.753 m (5' 9\")   Wt 70.3 kg (155 lb)   BMI 22.89 kg/m      Constitutional:  cooperative, alert and oriented, well developed, well nourished, in no acute distress        Skin:  warm and dry to the touch, no apparent skin lesions or masses noted          Head:  normocephalic, no masses or lesions        Eyes:  pupils equal and round;sclera white        Lymph:      ENT:  no pallor or cyanosis, dentition good        Neck:  carotid pulses are full and equal bilaterally, JVP normal, no carotid bruit        Respiratory:  normal breath sounds, clear to auscultation, normal A-P diameter, normal symmetry, normal respiratory excursion, no use of accessory muscles         Cardiac: regular rhythm, normal S1/S2, no S3 or S4, apical impulse not displaced, no murmurs, gallops or rubs                pulses full and equal     right radial artery;2+             left radial artery;2+                    GI:  abdomen soft;non-tender        Extremities and Muscular Skeletal:  no deformities, clubbing, cyanosis, erythema observed;no edema              Neurological:  no gross motor deficits        Psych:  Alert and Oriented x 3    Encounter Diagnoses   Name Primary?     Acute myocarditis due to other organism      Myopericarditis        Recent Lab Results:  LIPID RESULTS:  No results found for: CHOL, HDL, LDL, TRIG, CHOLHDLRATIO    LIVER ENZYME RESULTS:  No results found for: AST, ALT    CBC RESULTS:  Lab Results   Component Value Date    WBC 11.0 07/10/2019    RBC 5.22 07/10/2019    HGB 15.8 07/10/2019    HCT 44.8 07/10/2019    MCV 86 07/10/2019    MCH 30.3 07/10/2019    MCHC 35.3 07/10/2019    RDW 11.6 07/10/2019     07/10/2019       BMP RESULTS:  Lab Results   Component Value Date     07/12/2019    POTASSIUM 4.3 07/12/2019    CHLORIDE 102 07/12/2019    CO2 31 07/12/2019    " ANIONGAP 5 07/12/2019    GLC 88 07/12/2019    BUN 13 07/12/2019    CR 0.95 07/12/2019    GFRESTIMATED >90 07/12/2019    GFRESTBLACK >90 07/12/2019    SIGIFREDO 9.0 07/12/2019        A1C RESULTS:  No results found for: A1C    INR RESULTS:  No results found for: INR        CC  Jessica Mercedes APRN CNP  [facility]  [id number]  [place] [id number]

## 2019-07-18 NOTE — LETTER
7/18/2019    Kendrick Montero MD  [facility] [address]  [place] [address]    RE: Barak CONTI Renan       Dear Colleague,    I had the pleasure of seeing Barak Urrutia in the [facility].    HPI and Plan:   I had the pleasure of seeing Barak Urrutia today in cardiology clinic follow up. He is a pleasant 18 year old patient of Dr. Bernal with a history of acute myopericarditis, mildly reduced LVEF 47% secondary to #1, and streptococcal pharyngitis.    Mr. Urrutia presented to [facility] emergency department on July 10, 2019 for pharyngitis, cough and shortness of breath.  He was actively being treated for strep throat.  His temperature peaked at 103.  He was started on amoxicillin.      His symptoms persisted and he developed a new onset of chest pain with shortness of breath therefore, his mother took him to the emergency room.  His EKG was concerning for pericarditis with diffuse ST elevation and ID depression.  His troponin was elevated at 14.  His inflammatory markers were also elevated (Sed rate 21 and ).  The echocardiogram demonstrated a reduced left ventricular systolic function with an ejection fraction of 45 to 50%.  There was moderate distal anterior, septal and apical wall hypokinesis. There was borderline to mild global hypokinesia of the LV.  No pericardial effusion. He was started on colchicine and ibuprofen for myopericarditis and transferred to [facility] for a cardiac MRI.     He underwent a cardiac MRI which demonstrated a reduced LVEF at 43% and mild global hypokinesis. The RV function was low, normal at 51%. Late gadolinium enhancement imaging shows extensive myocarditis. There was some hyperenhancement of pericardium layering the right ventricle and inferior segments, suggesting pericarditis. Following the cardiac MRI, the patient's troponin increased to 51. He underwent CT coronary angiogram which demonstrated a  normal coronary anatomy with no detectable stenosis or plaque. Due to the myocarditis burden and risk for arrhythmias as well as a mildly reduced LVEF he was initiated on low-dose metoprolol 12.5 mg twice daily.     His discharge medications consisted of: Ibuprofen 800 mg 3 times daily for another 5 days, taper down to 400 mg 3 times daily for 5 days, then twice daily and stop.  If he gets recurrent pain, consider restarting. Continue colchicine 0.6 mg daily for a minimum of 3 to 6 months. PPI coverage for NSAID. Tramadol 50 mg 3 times daily for 5 days, and then as needed. Continue Metoprolol Tartrate 12.5 mg twice daily.    Today he presents to the cardiology clinic following discharge.  He has been doing well from a cardiac standpoint and remains chest pain-free.  He is taken his colchicine and ibuprofen as scheduled. However, he forgot a dosage of ibuprofen once and experienced a mild discomfort.  This quickly subsided with his next scheduled ibuprofen dose.  He begins the weaning process of his ibuprofen tomorrow.    He states he experienced mild diarrhea after initiating the colchicine however that quickly resolved.  He continues to take the Protonix.  He has completed his penicillin.    He is eager to get back to school however, he states he is bored due to his exercise restriction.  He is scheduled to see Dr. Bernal on August 20, 2019 with a cardiac MRI, inflammatory markers and a ZIO patch prior to his visit.     Physical Exam  Please see Below     Assessment and Plan  1. Acute myopericarditis.  He is doing well without recurrent chest pain.  Continue colchicine for 3 to 6 months.  He begin the ibuprofen taper 07/18 which consists of reducing the ibuprofen to 400 mg (from 800 mg) 3 times daily for 5 days, then twice daily and stop.  I have asked him to call me if he experiences recurrent chest pain during this taper.  He is scheduled to see Dr. Bernal on August 20.  Prior to his visit, he is scheduled to undergo  a cardiac MRI, have inflammatory markers drawn and a Zio patch monitor placed on August 12.    He appears to be sad due to his recent diagnosis and exercise restrictions.  We discussed at length today the importance of getting out to see his friends and family as well as being somewhat active.  I have encouraged him to do things he enjoys in moderation such as, going for a walk outside. I did review this with Dr. Bernal as well.    2. Mildly reduced LV systolic function, EF 47%.  He denies shortness of breath, orthopnea, PND.  Continue current dose of metoprolol tartrate 12.5 mg twice daily.     3. Streptococcal pharyngitis.  Continue duration of penicillin dosage as prescribed.      Thank you for allowing me to care for Barak Urrutia today.    DAGOBERTO Worley, CNP  Cardiology    Voice recognition software was used for this note, I have reviewed this note, but errors may have been missed.    No orders of the defined types were placed in this encounter.    No orders of the defined types were placed in this encounter.    There are no discontinued medications.      CURRENT MEDICATIONS:  Current Outpatient Medications   Medication Sig Dispense Refill     acetaminophen (TYLENOL) 500 MG tablet Take 1,000 mg by mouth every 6 hours as needed for mild pain       albuterol (PROAIR HFA/PROVENTIL HFA/VENTOLIN HFA) 108 (90 Base) MCG/ACT inhaler Inhale 1-2 puffs into the lungs every 6 hours as needed for shortness of breath / dyspnea or wheezing       colchicine (COLCYRS) 0.6 MG tablet Take 1 tablet (0.6 mg) by mouth 2 times daily 60 tablet 0     ibuprofen (ADVIL/MOTRIN) 800 MG tablet Take 1 tablet (800 mg) by mouth 3 times daily for 14 days Take less each day once you are chest pain free. 42 tablet 0     metoprolol succinate ER (TOPROL-XL) 25 MG 24 hr tablet Take 1 tablet (25 mg) by mouth daily 30 tablet 0     pantoprazole (PROTONIX) 40 MG EC tablet Take 1 tablet (40 mg) by mouth 2 times daily (before meals) Stop taking when you  are no longer taking ibuprofen. 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 No pertinent surgical history.    FAMILY HISTORY:  History reviewed. No pertinent family history.    SOCIAL HISTORY:  Social History     Socioeconomic History     Marital status: Single     Spouse name: None     Number of children: None     Years of education: None     Highest education level: None   Occupational History     None   Social Needs     Financial resource strain: None     Food insecurity:     Worry: None     Inability: None     Transportation needs:     Medical: None     Non-medical: None   Tobacco Use     Smoking status: Current Every Day Smoker     Smokeless tobacco: Never Used     Tobacco comment: e-cig 1-2 times a day   Substance and Sexual Activity     Alcohol use: Yes     Comment: not right know     Drug use: None     Sexual activity: None   Lifestyle     Physical activity:     Days per week: None     Minutes per session: None     Stress: None   Relationships     Social connections:     Talks on phone: None     Gets together: None     Attends Yarsani service: None     Active member of club or organization: None     Attends meetings of clubs or organizations: None     Relationship status: None     Intimate partner violence:     Fear of current or ex partner: None     Emotionally abused: None     Physically abused: None     Forced sexual activity: None   Other Topics Concern     None   Social History Narrative     None       Review of Systems:  Skin:  Negative for       Eyes:  Positive for  "glasses    ENT:  Negative for      Respiratory:  Positive for cough     Cardiovascular:    Positive for chest dicomfort  Gastroenterology: Negative for      Genitourinary:  not assessed      Musculoskeletal:  Negative for      Neurologic:  Negative for      Psychiatric:  Negative for      Heme/Lymph/Imm:  Negative      Endocrine:  Negative        Physical Exam:  Vitals: /73   Pulse 76   Ht 1.753 m (5' 9\")   Wt 70.3 kg (155 lb)   BMI 22.89 kg/m       Constitutional:  cooperative, alert and oriented, well developed, well nourished, in no acute distress        Skin:  warm and dry to the touch, no apparent skin lesions or masses noted          Head:  normocephalic, no masses or lesions        Eyes:  pupils equal and round;sclera white        Lymph:      ENT:  no pallor or cyanosis, dentition good        Neck:  carotid pulses are full and equal bilaterally, JVP normal, no carotid bruit        Respiratory:  normal breath sounds, clear to auscultation, normal A-P diameter, normal symmetry, normal respiratory excursion, no use of accessory muscles         Cardiac: regular rhythm, normal S1/S2, no S3 or S4, apical impulse not displaced, no murmurs, gallops or rubs                pulses full and equal     right radial artery;2+             left radial artery;2+                    GI:  abdomen soft;non-tender        Extremities and Muscular Skeletal:  no deformities, clubbing, cyanosis, erythema observed;no edema              Neurological:  no gross motor deficits        Psych:  Alert and Oriented x 3    Encounter Diagnoses   Name Primary?     Acute myocarditis due to other organism      Myopericarditis        Recent Lab Results:  LIPID RESULTS:  No results found for: CHOL, HDL, LDL, TRIG, CHOLHDLRATIO    LIVER ENZYME RESULTS:  No results found for: AST, ALT    CBC RESULTS:  Lab Results   Component Value Date    WBC 11.0 07/10/2019    RBC 5.22 07/10/2019    HGB 15.8 07/10/2019    HCT 44.8 07/10/2019    MCV 86 " 07/10/2019    MCH 30.3 07/10/2019    MCHC 35.3 07/10/2019    RDW 11.6 07/10/2019     07/10/2019       BMP RESULTS:  Lab Results   Component Value Date     07/12/2019    POTASSIUM 4.3 07/12/2019    CHLORIDE 102 07/12/2019    CO2 31 07/12/2019    ANIONGAP 5 07/12/2019    GLC 88 07/12/2019    BUN 13 07/12/2019    CR 0.95 07/12/2019    GFRESTIMATED >90 07/12/2019    GFRESTBLACK >90 07/12/2019    SIGIFREDO 9.0 07/12/2019        A1C RESULTS:  No results found for: A1C    INR RESULTS:  No results found for: INR        CC  DAGOBERTO Camacho CNP  [facility]  [address]  [place] [id number]                  Thank you for allowing me to participate in the care of your patient.    Sincerely,     DAGOBERTO Worley CNP     [facility]

## 2019-07-29 ENCOUNTER — TRANSFERRED RECORDS (OUTPATIENT)
Dept: HEALTH INFORMATION MANAGEMENT | Facility: CLINIC | Age: 19
End: 2019-07-29

## 2019-07-29 ENCOUNTER — TELEPHONE (OUTPATIENT)
Dept: CARDIOLOGY | Facility: CLINIC | Age: 19
End: 2019-07-29

## 2019-07-29 PROBLEM — I42.9 SECONDARY CARDIOMYOPATHY (H): Status: ACTIVE | Noted: 2019-07-01

## 2019-07-29 PROBLEM — R07.81 PLEURITIC CHEST PAIN: Status: ACTIVE | Noted: 2019-07-01

## 2019-07-29 NOTE — TELEPHONE ENCOUNTER
Pts mother called stating that patient has developed a fever again and is congested. Patient had strep throat prior to myopericarditis. Currently patient has no throat pain, just congestion and fever. Last night his fever was 102 but this morning seems to be down to 99 after his colchicine and ibuprofen. Pt currently taking colchicine 0.6 BID and Ibuprofen 400 mg twice daily. Tomorrow is the last day patient will take ibuprofen. Pt denies chest pain.    Pts mother wondering if patient can take tylenol, dayquil and/or nyquil. Or what Chio would recommend patient do for the fever and congestion.     Will route to Chio to review         7/18/19 OV with Chio NP   Assessment and Plan  1. Acute myopericarditis.  He is doing well without recurrent chest pain.  Continue colchicine for 3 to 6 months.  He begin the ibuprofen taper 07/18 which consists of reducing the ibuprofen to 400 mg (from 800 mg) 3 times daily for 5 days, then twice daily and stop.  I have asked him to call me if he experiences recurrent chest pain during this taper.  He is scheduled to see Dr. Bernal on August 20.  Prior to his visit, he is scheduled to undergo a cardiac MRI, have inflammatory markers drawn and a Zio patch monitor placed on August 12.

## 2019-07-29 NOTE — TELEPHONE ENCOUNTER
Spoke to patients mother and patient is going to go in to PMD clinic to be evaluated as he has just finished his antibiotic. Chio updated and agrees with plan.

## 2019-07-30 ENCOUNTER — TELEPHONE (OUTPATIENT)
Dept: CARDIOLOGY | Facility: CLINIC | Age: 19
End: 2019-07-30

## 2019-07-30 NOTE — TELEPHONE ENCOUNTER
Dr. Bernal OV 8-20-19. Records requested from [facility]. - No appointment scheduled at this time.

## 2019-08-08 ENCOUNTER — TELEPHONE (OUTPATIENT)
Dept: LAB | Facility: CLINIC | Age: 19
End: 2019-08-08

## 2019-08-08 DIAGNOSIS — I31.9 MYOPERICARDITIS: ICD-10-CM

## 2019-08-08 RX ORDER — METOPROLOL SUCCINATE 25 MG/1
25 TABLET, EXTENDED RELEASE ORAL DAILY
Qty: 90 TABLET | Refills: 1 | Status: SHIPPED | OUTPATIENT
Start: 2019-08-08 | End: 2019-08-20

## 2019-08-08 RX ORDER — COLCHICINE 0.6 MG/1
0.6 TABLET ORAL 2 TIMES DAILY
Qty: 60 TABLET | Refills: 1 | Status: SHIPPED | OUTPATIENT
Start: 2019-08-08 | End: 2019-08-20

## 2019-08-08 NOTE — TELEPHONE ENCOUNTER
LOV 7/18/19. RN refilled rx per RN refill protocol.                 7/18/19 OV COLLIN Chio  Assessment and Plan  1. Acute myopericarditis.  He is doing well without recurrent chest pain.  Continue colchicine for 3 to 6 months.  He begin the ibuprofen taper 07/18 which consists of reducing the ibuprofen to 400 mg (from 800 mg) 3 times daily for 5 days, then twice daily and stop.  I have asked him to call me if he experiences recurrent chest pain during this taper.  He is scheduled to see Dr. Bernal on August 20.  Prior to his visit, he is scheduled to undergo a cardiac MRI, have inflammatory markers drawn and a Zio patch monitor placed on August 12.     He appears to be sad due to his recent diagnosis and exercise restrictions.  We discussed at length today the importance of getting out to see his friends and family as well as being somewhat active.  I have encouraged him to do things he enjoys in moderation such as, going for a walk outside. I did review this with Dr. Bernal as well.     2. Mildly reduced LV systolic function, EF 47%.  He denies shortness of breath, orthopnea, PND.  Continue current dose of metoprolol tartrate 12.5 mg twice daily.      3. Streptococcal pharyngitis.  Continue duration of penicillin dosage as prescribed.        Thank you for allowing me to care for Barak Urrutia today.     DAGOBERTO Worley, CNP  Cardiology

## 2019-08-12 ENCOUNTER — HOSPITAL ENCOUNTER (OUTPATIENT)
Dept: CARDIOLOGY | Facility: CLINIC | Age: 19
Discharge: HOME | End: 2019-08-12
Attending: NURSE PRACTITIONER | Admitting: NURSE PRACTITIONER
Payer: COMMERCIAL

## 2019-08-12 ENCOUNTER — TELEPHONE (OUTPATIENT)
Dept: CARDIOLOGY | Facility: CLINIC | Age: 19
End: 2019-08-12

## 2019-08-12 DIAGNOSIS — I31.9 MYOPERICARDITIS: ICD-10-CM

## 2019-08-12 DIAGNOSIS — I41: ICD-10-CM

## 2019-08-12 LAB
ALBUMIN SERPL-MCNC: 3.9 G/DL (ref 3.4–5)
ALP SERPL-CCNC: 49 U/L (ref 40–150)
ALT SERPL W P-5'-P-CCNC: 42 U/L (ref 0–70)
ANION GAP SERPL CALCULATED.3IONS-SCNC: 5 MMOL/L (ref 3–14)
AST SERPL W P-5'-P-CCNC: 20 U/L (ref 0–45)
BILIRUB SERPL-MCNC: 0.8 MG/DL (ref 0.2–1.3)
BUN SERPL-MCNC: 20 MG/DL (ref 7–30)
CALCIUM SERPL-MCNC: 9.6 MG/DL (ref 8.5–10.1)
CHLORIDE SERPL-SCNC: 104 MMOL/L (ref 94–109)
CO2 SERPL-SCNC: 29 MMOL/L (ref 20–32)
CREAT SERPL-MCNC: 1 MG/DL (ref 0.66–1.25)
CRP SERPL-MCNC: <2.9 MG/L (ref 0–8)
ERYTHROCYTE [DISTWIDTH] IN BLOOD BY AUTOMATED COUNT: 12.2 % (ref 10–15)
ERYTHROCYTE [SEDIMENTATION RATE] IN BLOOD BY WESTERGREN METHOD: 8 MM/H (ref 0–15)
GFR SERPL CREATININE-BSD FRML MDRD: >90 ML/MIN/{1.73_M2}
GLUCOSE SERPL-MCNC: 102 MG/DL (ref 70–99)
HCT VFR BLD AUTO: 43.4 % (ref 40–53)
HGB BLD-MCNC: 15.3 G/DL (ref 13.3–17.7)
MCH RBC QN AUTO: 30 PG (ref 26.5–33)
MCHC RBC AUTO-ENTMCNC: 35.3 G/DL (ref 31.5–36.5)
MCV RBC AUTO: 85 FL (ref 78–100)
PLATELET # BLD AUTO: 299 10E9/L (ref 150–450)
POTASSIUM SERPL-SCNC: 3.8 MMOL/L (ref 3.4–5.3)
PROT SERPL-MCNC: 8.3 G/DL (ref 6.8–8.8)
RBC # BLD AUTO: 5.1 10E12/L (ref 4.4–5.9)
SODIUM SERPL-SCNC: 138 MMOL/L (ref 133–144)
WBC # BLD AUTO: 7.3 10E9/L (ref 4–11)

## 2019-08-12 PROCEDURE — 80053 COMPREHEN METABOLIC PANEL: CPT | Performed by: NURSE PRACTITIONER

## 2019-08-12 PROCEDURE — 75561 CARDIAC MRI FOR MORPH W/DYE: CPT | Mod: 26 | Performed by: INTERNAL MEDICINE

## 2019-08-12 PROCEDURE — A9585 GADOBUTROL INJECTION: HCPCS | Performed by: NURSE PRACTITIONER

## 2019-08-12 PROCEDURE — 85027 COMPLETE CBC AUTOMATED: CPT | Performed by: NURSE PRACTITIONER

## 2019-08-12 PROCEDURE — 36415 COLL VENOUS BLD VENIPUNCTURE: CPT | Performed by: NURSE PRACTITIONER

## 2019-08-12 PROCEDURE — 75561 CARDIAC MRI FOR MORPH W/DYE: CPT

## 2019-08-12 PROCEDURE — 85652 RBC SED RATE AUTOMATED: CPT | Performed by: NURSE PRACTITIONER

## 2019-08-12 PROCEDURE — 86140 C-REACTIVE PROTEIN: CPT | Performed by: NURSE PRACTITIONER

## 2019-08-12 PROCEDURE — 0296T ZIO PATCH HOLTER ADULT PEDIATRIC GREATER THAN 48 HRS: CPT

## 2019-08-12 PROCEDURE — 25500064 ZZH RX 255 OP 636: Performed by: NURSE PRACTITIONER

## 2019-08-12 PROCEDURE — 0298T ZIO PATCH HOLTER ADULT PEDIATRIC GREATER THAN 48 HRS: CPT | Performed by: INTERNAL MEDICINE

## 2019-08-12 RX ORDER — DIPHENHYDRAMINE HCL 25 MG
25 CAPSULE ORAL
Status: DISCONTINUED | OUTPATIENT
Start: 2019-08-12 | End: 2019-08-13 | Stop reason: HOSPADM

## 2019-08-12 RX ORDER — DIAZEPAM 5 MG
5 TABLET ORAL EVERY 30 MIN PRN
Status: DISCONTINUED | OUTPATIENT
Start: 2019-08-12 | End: 2019-08-13 | Stop reason: HOSPADM

## 2019-08-12 RX ORDER — ONDANSETRON 2 MG/ML
4 INJECTION INTRAMUSCULAR; INTRAVENOUS
Status: DISCONTINUED | OUTPATIENT
Start: 2019-08-12 | End: 2019-08-13 | Stop reason: HOSPADM

## 2019-08-12 RX ORDER — GADOBUTROL 604.72 MG/ML
5-65 INJECTION INTRAVENOUS ONCE
Status: COMPLETED | OUTPATIENT
Start: 2019-08-12 | End: 2019-08-12

## 2019-08-12 RX ORDER — DIPHENHYDRAMINE HYDROCHLORIDE 50 MG/ML
25-50 INJECTION INTRAMUSCULAR; INTRAVENOUS
Status: DISCONTINUED | OUTPATIENT
Start: 2019-08-12 | End: 2019-08-13 | Stop reason: HOSPADM

## 2019-08-12 RX ORDER — ACYCLOVIR 200 MG/1
0-1 CAPSULE ORAL
Status: DISCONTINUED | OUTPATIENT
Start: 2019-08-12 | End: 2019-08-13 | Stop reason: HOSPADM

## 2019-08-12 RX ORDER — METHYLPREDNISOLONE SODIUM SUCCINATE 125 MG/2ML
125 INJECTION, POWDER, LYOPHILIZED, FOR SOLUTION INTRAMUSCULAR; INTRAVENOUS
Status: DISCONTINUED | OUTPATIENT
Start: 2019-08-12 | End: 2019-08-13 | Stop reason: HOSPADM

## 2019-08-12 RX ADMIN — GADOBUTROL 9 ML: 604.72 INJECTION INTRAVENOUS at 09:51

## 2019-08-12 NOTE — PROGRESS NOTES
Cardiac Core protocol  T1 w and w/o FS  T2 w and w/o FS  Free breathing  Contrast administered per weight based protocol.  Per Dr Whitfield

## 2019-08-12 NOTE — TELEPHONE ENCOUNTER
RN called patient and left VM advising patient to call back to discuss MRI results and plan.       ----- Message from DAGOBERTO Camacho CNP sent at 8/12/2019  1:59 PM CDT -----  Please call Barak or his mom and given result of MRI. Improvement in EF and decrease in burden of myocardial scar. Follow up with Dr. Bernal as scheduled.     DAGOBERTO Cox, CNP

## 2019-08-20 ENCOUNTER — DOCUMENTATION ONLY (OUTPATIENT)
Dept: CARDIOLOGY | Facility: CLINIC | Age: 19
End: 2019-08-20

## 2019-08-20 ENCOUNTER — OFFICE VISIT (OUTPATIENT)
Dept: CARDIOLOGY | Facility: CLINIC | Age: 19
End: 2019-08-20
Attending: NURSE PRACTITIONER
Payer: COMMERCIAL

## 2019-08-20 VITALS
SYSTOLIC BLOOD PRESSURE: 100 MMHG | HEIGHT: 69 IN | DIASTOLIC BLOOD PRESSURE: 70 MMHG | BODY MASS INDEX: 23.09 KG/M2 | OXYGEN SATURATION: 100 % | WEIGHT: 155.9 LBS | HEART RATE: 80 BPM

## 2019-08-20 DIAGNOSIS — I31.9 MYOPERICARDITIS: Primary | ICD-10-CM

## 2019-08-20 PROBLEM — I51.4 MYOCARDITIS (H): Status: RESOLVED | Noted: 2019-07-10 | Resolved: 2019-08-20

## 2019-08-20 PROBLEM — R07.81 PLEURITIC CHEST PAIN: Status: RESOLVED | Noted: 2019-07-01 | Resolved: 2019-08-20

## 2019-08-20 PROBLEM — I42.9 SECONDARY CARDIOMYOPATHY (H): Status: RESOLVED | Noted: 2019-07-01 | Resolved: 2019-08-20

## 2019-08-20 PROCEDURE — 99214 OFFICE O/P EST MOD 30 MIN: CPT | Performed by: INTERNAL MEDICINE

## 2019-08-20 PROCEDURE — 93000 ELECTROCARDIOGRAM COMPLETE: CPT | Performed by: INTERNAL MEDICINE

## 2019-08-20 RX ORDER — METOPROLOL SUCCINATE 25 MG/1
25 TABLET, EXTENDED RELEASE ORAL EVERY MORNING
Qty: 100 TABLET | Refills: 3 | Status: SHIPPED | OUTPATIENT
Start: 2019-08-20

## 2019-08-20 RX ORDER — COLCHICINE 0.6 MG/1
0.6 TABLET ORAL 2 TIMES DAILY
Qty: 200 TABLET | Refills: 1 | Status: SHIPPED | OUTPATIENT
Start: 2019-08-20

## 2019-08-20 ASSESSMENT — MIFFLIN-ST. JEOR: SCORE: 1707.54

## 2019-08-20 NOTE — PROGRESS NOTES
Service Date: 08/20/2019      PRIMARY CARE PROVIDER:  Dr. Kendrick Montero      CARDIOLOGY COLLIN:  Jessica Mercedes, DAGOBERTO, CNP      REASON FOR VISIT:  Post-hospital followup for myopericarditis and LV dysfunction.      HISTORY OF PRESENT ILLNESS:    Barak Urrutia is a delightful young man who is 18 years old,  and going to start college soon at [employer], [place] in Civil Engineering.  He was accompanied by his mother today.      Barak is known to me from his recent hospitalization when he was admitted with streptococcus pharyngitis and severe chest pain and diagnosed with myopericarditis (peak troponin 46.7) and ECG changes consistent with pericarditis and a mild leukocytosis of 11.0.  His cardiac MRI showed extensive hyper-enhancement consistent with myocarditis and mild LV dysfunction with an LVEF of 43% and global hypokinesis.  Given his markedly elevated troponin, we obtained a CT coronary angiogram which showed normal coronary anatomy without stenosis.  There was no evidence of arrhythmias.  He was started on colchicine 0.6 mg b.i.d., ibuprofen for chest pain and low-dose metoprolol XL 25 mg daily.  Post discharge, he had another sore throat and was diagnosed with infectious mononucleosis.      Barak has made an excellent recovery.  He is chest pain-free.  His inflammatory markers have normalized.  CRP is negative (down from 114), sed rate is normal at 8 (down from 21).  A repeat cardiac MRI shows normalization of LV function to 61% without regional wall motion abnormalities, normal RVEF of 57%.  He does have residual myocardial delayed enhancement but this has significantly improved to the previous study dated 07/11/2019.      Other labs:  Creatinine is normal at 1.0.  Normal liver enzymes.  CRP less than 2.9.  Hemoglobin 15.3, leukocytosis 7.3, sed rate 8, platelets 299.       ECG done today shows normal sinus rhythm of 69 BPM with early repolarization pattern,  milliseconds, QTc 359 milliseconds.       PHYSICAL EXAMINATION:   VITAL SIGNS:  /70, pulse 80 per minute.   CARDIOVASCULAR:  Regular heart sounds.  No murmur, no pericardial friction rub.   LUNGS:  Clear.   EXTREMITIES:  No lower extremity edema.      DIAGNOSES, ASSESSMENT, PLAN:     1.  Acute myopericarditis with mild LV dysfunction, resolved.   Barak's symptoms have resolved.  He is hemodynamically stable.  No arrhythmias, inflammatory markers and LV function normalized and his delayed myocardial enhancement has vastly improved.     -  Continue colchicine 0.6 mg b.i.d. for another 8-10 weeks.  Prescription renewed.  He has no side effects from it.   -  Continue metoprolol XL 25 mg daily until all the delayed enhancement has resolved.   -  I will see him back in clinic in 6 months with a pre-visit transthoracic echocardiogram with pre-visit labs.  In order to reduce healthcare costs, repeat imaging is deferred for now.      It was my pleasure to visit with this young gentleman and his mother.  I wish him all the best in college.         BOB DOTSON MD             D: 2019   T: 2019   MT: LYDIA      Name:     BARAK GERMAIN   MRN:      [id number]        Account:      [id number]   :      2000           Service Date: 2019      Document: [id number]

## 2019-08-20 NOTE — LETTER
8/20/2019      Kendrick Montero MD  [facility] [address]  [facility] [address]      RE: Barak Urrutia       Dear Colleague,    I had the pleasure of seeing Barak Urrutia in the [facility].    Service Date: 08/20/2019      PRIMARY CARE PROVIDER:  Dr. Kendrick Montero      CARDIOLOGY COLLIN:  Jessica Mercedes, APRN, CNP      REASON FOR VISIT:  Post-hospital followup for myopericarditis and LV dysfunction.      HISTORY OF PRESENT ILLNESS:  Barak Urrutia is a delightful young man who is 18 years old,  and going to start college soon at [employer], [place] in [place].  He was accompanied by his mother today.      Barak is known to me from his recent hospitalization when he was admitted with streptococcus pharyngitis and severe chest pain and diagnosed with myopericarditis (peak troponin 46.7) and ECG changes consistent with pericarditis and a mild leukocytosis of 11.0.  His cardiac MRI showed extensive hyper-enhancement consistent with myocarditis and mild LV dysfunction with an LVEF of 43% and global hypokinesis.  Given his markedly elevated troponin, we obtained a CT coronary angiogram which showed normal coronary anatomy without stenosis.  There was no evidence of arrhythmias.  He was started on colchicine 0.6 mg b.i.d., ibuprofen for chest pain and low-dose metoprolol XL 25 mg daily.  Post discharge, he had another sore throat and was diagnosed with infectious mononucleosis.      Barak has made an excellent recovery.  He is chest pain-free.  His inflammatory markers have normalized.  CRP is negative (down from 114), sed rate is normal at 8 (down from 21).  A repeat cardiac MRI shows normalization of LV function to 61% without regional wall motion abnormalities, normal RVEF of 57%.  He does have residual myocardial delayed enhancement but this has significantly improved to the previous study dated 07/11/2019.      OTHER LABORATORIES:  Creatinine is  normal at 1.0.  Normal liver enzymes.  CRP less than 2.9.  Hemoglobin 15.3, leukocytosis 7.3, sed rate 8, platelets 299.       RADIOLOGIC STUDIES:  ECG done today shows normal sinus rhythm of 69 BPM with early repolarization pattern,  milliseconds, QTc 359 milliseconds.      PHYSICAL EXAMINATION:   VITAL SIGNS:  /70, pulse 80 per minute.   CARDIOVASCULAR:  Regular heart sounds.  No murmur, no pericardial friction rub.   LUNGS:  Clear.   EXTREMITIES:  No lower extremity edema.      DIAGNOSES, ASSESSMENT, PLAN:     1.  Acute myopericarditis with mild LV dysfunction, resolved.   Barak's symptoms have resolved.  He is hemodynamically stable.  No arrhythmias, inflammatory markers and LV function normalized and his delayed myocardial enhancement has vastly improved.   2.  Continue colchicine 0.6 mg b.i.d. for another 8-10 weeks.  Prescription renewed.  He has no side effects from it.   3.  Continue metoprolol XL 25 mg daily until all the delayed enhancement has resolved.   4.  I will see him back in clinic in 6 months with a pre-visit transthoracic echocardiogram with pre-visit labs.  In order to reduce healthcare costs, repeat imaging is deferred for now.      It was my pleasure to visit with this young gentleman and his mother.  I wish him all the best in college.         BOB DOTSON MD             D: 2019   T: 2019   MT: LYDIA      Name:     BARAK GERMAIN   MRN:      [id number]        Account:      [id number]   :      2000           Service Date: 2019      Document: [id number]         Outpatient Encounter Medications as of 2019   Medication Sig Dispense Refill     acetaminophen (TYLENOL) 500 MG tablet Take 1,000 mg by mouth every 6 hours as needed for mild pain       albuterol (PROAIR HFA/PROVENTIL HFA/VENTOLIN HFA) 108 (90 Base) MCG/ACT inhaler Inhale 1-2 puffs into the lungs every 6 hours as needed for shortness of breath / dyspnea or wheezing       colchicine  (COLCYRS) 0.6 MG tablet Take 1 tablet (0.6 mg) by mouth 2 times daily 200 tablet 1     metoprolol succinate ER (TOPROL-XL) 25 MG 24 hr tablet Take 1 tablet (25 mg) by mouth every morning 100 tablet 3     [] penicillin V (VEETID) 500 MG tablet Take 1 tablet (500 mg) by mouth 2 times daily for 4 days 8 tablet 0     [DISCONTINUED] colchicine (COLCYRS) 0.6 MG tablet Take 1 tablet (0.6 mg) by mouth 2 times daily 60 tablet 1     [DISCONTINUED] ibuprofen (ADVIL/MOTRIN) 800 MG tablet Take 1 tablet (800 mg) by mouth 3 times daily for 14 days Take less each day once you are chest pain free. 42 tablet 0     [DISCONTINUED] metoprolol succinate ER (TOPROL-XL) 25 MG 24 hr tablet Take 1 tablet (25 mg) by mouth daily 90 tablet 1     [DISCONTINUED] pantoprazole (PROTONIX) 40 MG EC tablet Take 1 tablet (40 mg) by mouth 2 times daily (before meals) Stop taking when you are no longer taking ibuprofen. (Patient not taking: Reported on 2019) 28 tablet 0     No facility-administered encounter medications on file as of 2019.        Again, thank you for allowing me to participate in the care of your patient.      Sincerely,    Shavon Bernal MD     [facility]

## 2019-08-20 NOTE — PROGRESS NOTES
Clinic visit note dictated. Dictation reference number - [id number]        REVIEW OF SYSTEMS:  A comprehensive 10-point review of systems was completed and the pertinent positives are documented in the history of present illness.    Skin:  Negative     Eyes:  Positive for glasses  ENT:  Negative    Respiratory:  Negative    Cardiovascular:  Negative    Gastroenterology: Negative    Genitourinary:  Negative    Musculoskeletal:  Negative    Neurologic:  Negative    Psychiatric:  Negative    Heme/Lymph/Imm:  Negative    Endocrine:  Negative      CURRENT MEDICATIONS:  Current Outpatient Medications   Medication Sig Dispense Refill     acetaminophen (TYLENOL) 500 MG tablet Take 1,000 mg by mouth every 6 hours as needed for mild pain       albuterol (PROAIR HFA/PROVENTIL HFA/VENTOLIN HFA) 108 (90 Base) MCG/ACT inhaler Inhale 1-2 puffs into the lungs every 6 hours as needed for shortness of breath / dyspnea or wheezing       colchicine (COLCYRS) 0.6 MG tablet Take 1 tablet (0.6 mg) by mouth 2 times daily 200 tablet 1     metoprolol succinate ER (TOPROL-XL) 25 MG 24 hr tablet Take 1 tablet (25 mg) by mouth every morning 100 tablet 3         ALLERGIES:  No Known Allergies    PAST MEDICAL HISTORY:    Past Medical History:   Diagnosis Date     Abnormal EKG 07/2019    diffuse ST-segment elevations     Concussion without loss of consciousness      Elevated troponin 07/2019    peak 56     Exercise-induced asthma      Myocarditis (H) 7/10/2019     Pleuritic chest pain 07/2019     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    FAMILY HISTORY:    No family history on file.    SOCIAL HISTORY:    Social History     Socioeconomic History     Marital status: Single     Spouse name: None     Number of children: None     Years of education: None     Highest education level: None   Occupational History     None   Social Needs     Financial  "resource strain: None     Food insecurity:     Worry: None     Inability: None     Transportation needs:     Medical: None     Non-medical: None   Tobacco Use     Smoking status: Current Every Day Smoker     Smokeless tobacco: Never Used     Tobacco comment: e-cig 1-2 times a day   Substance and Sexual Activity     Alcohol use: Yes     Comment: not right know     Drug use: None     Sexual activity: None   Lifestyle     Physical activity:     Days per week: None     Minutes per session: None     Stress: None   Relationships     Social connections:     Talks on phone: None     Gets together: None     Attends Jew service: None     Active member of club or organization: None     Attends meetings of clubs or organizations: None     Relationship status: None     Intimate partner violence:     Fear of current or ex partner: None     Emotionally abused: None     Physically abused: None     Forced sexual activity: None   Other Topics Concern     None   Social History Narrative     None       PHYSICAL EXAM:    Vitals: /70   Pulse 80   Ht 1.753 m (5' 9\")   Wt 70.7 kg (155 lb 14.4 oz)   SpO2 100%   BMI 23.02 kg/m    Wt Readings from Last 5 Encounters:   08/20/19 70.7 kg (155 lb 14.4 oz)   07/18/19 70.3 kg (155 lb)   07/13/19 72.8 kg (160 lb 6.4 oz)   07/10/19 72.6 kg (160 lb)           Encounter Diagnosis   Name Primary?     Myopericarditis Yes       Orders Placed This Encounter   Procedures     CBC with platelets differential     Comprehensive metabolic panel     Follow-Up with Cardiologist     EKG 12-lead complete w/read - Clinics     EKG 12-lead complete w/read - Clinics               "

## 2019-08-20 NOTE — PROGRESS NOTES
Message from Dr. Bernal:  Yasmine     Just saw patient in clinic.  He is going to [employer] in [facility] [place].  Patient requests copies of his records (clinic letters, recent discharge summary, cardiac imaging and labs) to be sent to the home.     Please call patient's mother and follow-up with her.     Thank you.   Dr. Bernal     Request sent to [facility] department

## 2019-08-20 NOTE — LETTER
8/20/2019    Kendrick Montero MD  [facility] [address]  [facility] [address]    RE: Barak Urrutia       Dear Colleague,    I had the pleasure of seeing Barak Urrutia in the [facility].    Clinic visit note dictated. Dictation reference number - [id number]        REVIEW OF SYSTEMS:  A comprehensive 10-point review of systems was completed and the pertinent positives are documented in the history of present illness.    Skin:  Negative     Eyes:  Positive for glasses  ENT:  Negative    Respiratory:  Negative    Cardiovascular:  Negative    Gastroenterology: Negative    Genitourinary:  Negative    Musculoskeletal:  Negative    Neurologic:  Negative    Psychiatric:  Negative    Heme/Lymph/Imm:  Negative    Endocrine:  Negative      CURRENT MEDICATIONS:  Current Outpatient Medications   Medication Sig Dispense Refill     acetaminophen (TYLENOL) 500 MG tablet Take 1,000 mg by mouth every 6 hours as needed for mild pain       albuterol (PROAIR HFA/PROVENTIL HFA/VENTOLIN HFA) 108 (90 Base) MCG/ACT inhaler Inhale 1-2 puffs into the lungs every 6 hours as needed for shortness of breath / dyspnea or wheezing       colchicine (COLCYRS) 0.6 MG tablet Take 1 tablet (0.6 mg) by mouth 2 times daily 200 tablet 1     metoprolol succinate ER (TOPROL-XL) 25 MG 24 hr tablet Take 1 tablet (25 mg) by mouth every morning 100 tablet 3         ALLERGIES:  No Known Allergies    PAST MEDICAL HISTORY:    Past Medical History:   Diagnosis Date     Abnormal EKG 07/2019    diffuse ST-segment elevations     Concussion without loss of consciousness      Elevated troponin 07/2019    peak 56     Exercise-induced asthma      Myocarditis (H) 7/10/2019     Pleuritic chest pain 07/2019     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    FAMILY HISTORY:    No family history on file.    SOCIAL  "HISTORY:    Social History     Socioeconomic History     Marital status: Single     Spouse name: None     Number of children: None     Years of education: None     Highest education level: None   Occupational History     None   Social Needs     Financial resource strain: None     Food insecurity:     Worry: None     Inability: None     Transportation needs:     Medical: None     Non-medical: None   Tobacco Use     Smoking status: Current Every Day Smoker     Smokeless tobacco: Never Used     Tobacco comment: e-cig 1-2 times a day   Substance and Sexual Activity     Alcohol use: Yes     Comment: not right know     Drug use: None     Sexual activity: None   Lifestyle     Physical activity:     Days per week: None     Minutes per session: None     Stress: None   Relationships     Social connections:     Talks on phone: None     Gets together: None     Attends Church service: None     Active member of club or organization: None     Attends meetings of clubs or organizations: None     Relationship status: None     Intimate partner violence:     Fear of current or ex partner: None     Emotionally abused: None     Physically abused: None     Forced sexual activity: None   Other Topics Concern     None   Social History Narrative     None       PHYSICAL EXAM:    Vitals: /70   Pulse 80   Ht 1.753 m (5' 9\")   Wt 70.7 kg (155 lb 14.4 oz)   SpO2 100%   BMI 23.02 kg/m     Wt Readings from Last 5 Encounters:   08/20/19 70.7 kg (155 lb 14.4 oz)   07/18/19 70.3 kg (155 lb)   07/13/19 72.8 kg (160 lb 6.4 oz)   07/10/19 72.6 kg (160 lb)           Encounter Diagnosis   Name Primary?     Myopericarditis Yes       Orders Placed This Encounter   Procedures     CBC with platelets differential     Comprehensive metabolic panel     Follow-Up with Cardiologist     EKG 12-lead complete w/read - Clinics     EKG 12-lead complete w/read - Clinics                 Thank you for allowing me to participate in the care of your " patient.      Sincerely,     Shavon Bernal MD     [facility]    cc:   DAGOBERTO Camacho [facility]  [address]  [place] [address]

## 2019-08-20 NOTE — PATIENT INSTRUCTIONS
MEDICATION CHANGES:  1.  Continue colchicine 0.6 mg 1 tablet 2 times daily for another 6 to 10 weeks.  2.  Continue metoprolol XL 25 mg 1 tablet daily in the morning.  Stay on this daily see me back in clinic.  3.  Stop ibuprofen and pantoprazole.    ADDITIONAL INSTRUCTIONS AND FOLLOW-UP:  1.  No exercise restrictions.  Start slowly and progress.  2.  Follow-up in 6 to 9 months in my clinic with labs.    If you have any questions or concerns, please contact my nurses at [phone].

## 2020-02-10 ENCOUNTER — HEALTH MAINTENANCE LETTER (OUTPATIENT)
Age: 20
End: 2020-02-10

## 2020-02-27 ENCOUNTER — TELEPHONE (OUTPATIENT)
Dept: CARDIOLOGY | Facility: CLINIC | Age: 20
End: 2020-02-27

## 2020-02-27 DIAGNOSIS — I31.9 MYOPERICARDITIS: Primary | ICD-10-CM

## 2020-02-27 NOTE — TELEPHONE ENCOUNTER
Voicemail received from patient's mother, Latoya (has accompanied patient to appointments), calling to ask if the patient still needs to take colchicine. She states he is almost out and they are wondering if he needs to continue it. Latoya also mentioned he is overdue for an MRI.     Returned Latoya's call; she states that the patient is in [place] for school and will be home for spring break the week of March 16th. She is hoping to have him seen then. 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     Called the patient to see if he has had an recurrence of symptoms since his hospitalization this summer. Pt denies any chest pain, shortness of breath, or increased fatigue. Pt denies any ongoing side effects from colchicine.     Will message Dr Bernal regarding colchicine and need for f/up.

## 2020-02-27 NOTE — TELEPHONE ENCOUNTER
Shavon Bernal MD  You; Parsons [facility] Team 2 10 minutes ago (4:09 PM)        Yes, okay to stop the colchicine.  And yes, okay to see the COLLIN instead of me.    Thank you.        Called patient back to review message as above per Dr Bernal. Pt verbalized understanding, will stop colchicine. Pt requested his mother be contacted to set up follow up.     Per orders, patient will be due for CBC, CMP, CRP, ESR, EKG & OV. Clinic note from 8/20/19 mentions echo, will clarify with Dr Bernal. Team 2 to call patient's mother to arrange f/up pending reply.

## 2020-02-28 NOTE — TELEPHONE ENCOUNTER
Shavon Bernal MD  You; Parsons [facility] Team 2 44 minutes ago (7:02 AM)      Ok        Order placed for echo. Message to scheduling to coordinate f/up.

## 2020-03-16 ENCOUNTER — TELEPHONE (OUTPATIENT)
Dept: CARDIOLOGY | Facility: CLINIC | Age: 20
End: 2020-03-16

## 2020-03-16 NOTE — TELEPHONE ENCOUNTER
RN called patient and left VM advising patient to call to review screening for COVID-19          Wellness Screening Tool    Symptom Screening:    Do you have a:    Fever?  Yes or no    Cough?  Yes or no    Shortness of breath?  Yes or no (if yes, is this a change?)    Skin rash?  Yes or no      Within the past 14 days, have you been in contact with someone who:    Is currently being ruled out for COVID-19 (novel coronavirus)?  Yes or no    Has tested positive for COVID-19?  Yes or no    Has symptoms of a respiratory illness (fever, cough, shortness of breath)?  Yes or no    Have you or someone you have been in contact with traveled to an area with COVID-19:    Refer to the CDC Coronavirus webpage for COVID-19 areas:  Yes or no (if yes, what country?)    Within the past 3 weeks, have you been exposed to the following:      Pertussis?  Yes or no    Chicken pox?  Yes or no    Measles?  Yes or no    Patient's appointment status:   patient will be seen in clinic as scheduled, appointment converted to phone visit, appointment rescheduled to a later date.

## 2020-03-17 ENCOUNTER — ALLIED HEALTH/NURSE VISIT (OUTPATIENT)
Dept: CARDIOLOGY | Facility: CLINIC | Age: 20
End: 2020-03-17
Payer: COMMERCIAL

## 2020-03-17 ENCOUNTER — HOSPITAL ENCOUNTER (OUTPATIENT)
Dept: CARDIOLOGY | Facility: CLINIC | Age: 20
Discharge: HOME | End: 2020-03-17
Attending: INTERNAL MEDICINE | Admitting: INTERNAL MEDICINE
Payer: COMMERCIAL

## 2020-03-17 DIAGNOSIS — I31.9 MYOPERICARDITIS: Primary | ICD-10-CM

## 2020-03-17 DIAGNOSIS — I31.9 MYOPERICARDITIS: ICD-10-CM

## 2020-03-17 LAB
ALBUMIN SERPL-MCNC: 4 G/DL (ref 3.4–5)
ALP SERPL-CCNC: 47 U/L (ref 40–150)
ALT SERPL W P-5'-P-CCNC: 24 U/L (ref 0–70)
ANION GAP SERPL CALCULATED.3IONS-SCNC: 2 MMOL/L (ref 3–14)
AST SERPL W P-5'-P-CCNC: 10 U/L (ref 0–45)
BASOPHILS # BLD AUTO: 0 10E9/L (ref 0–0.2)
BASOPHILS NFR BLD AUTO: 0.6 %
BILIRUB SERPL-MCNC: 0.5 MG/DL (ref 0.2–1.3)
BUN SERPL-MCNC: 18 MG/DL (ref 7–30)
CALCIUM SERPL-MCNC: 9.2 MG/DL (ref 8.5–10.1)
CHLORIDE SERPL-SCNC: 105 MMOL/L (ref 94–109)
CO2 SERPL-SCNC: 30 MMOL/L (ref 20–32)
CREAT SERPL-MCNC: 1.08 MG/DL (ref 0.66–1.25)
CRP SERPL-MCNC: <2.9 MG/L (ref 0–8)
DIFFERENTIAL METHOD BLD: NORMAL
EOSINOPHIL # BLD AUTO: 0.4 10E9/L (ref 0–0.7)
EOSINOPHIL NFR BLD AUTO: 6.3 %
ERYTHROCYTE [DISTWIDTH] IN BLOOD BY AUTOMATED COUNT: 12.8 % (ref 10–15)
ERYTHROCYTE [SEDIMENTATION RATE] IN BLOOD BY WESTERGREN METHOD: 5 MM/H (ref 0–15)
GFR SERPL CREATININE-BSD FRML MDRD: >90 ML/MIN/{1.73_M2}
GLUCOSE SERPL-MCNC: 68 MG/DL (ref 70–99)
HCT VFR BLD AUTO: 44.3 % (ref 40–53)
HGB BLD-MCNC: 15.2 G/DL (ref 13.3–17.7)
IMM GRANULOCYTES # BLD: 0 10E9/L (ref 0–0.4)
IMM GRANULOCYTES NFR BLD: 0.3 %
LYMPHOCYTES # BLD AUTO: 2.8 10E9/L (ref 0.8–5.3)
LYMPHOCYTES NFR BLD AUTO: 41 %
MCH RBC QN AUTO: 29.7 PG (ref 26.5–33)
MCHC RBC AUTO-ENTMCNC: 34.3 G/DL (ref 31.5–36.5)
MCV RBC AUTO: 87 FL (ref 78–100)
MONOCYTES # BLD AUTO: 0.6 10E9/L (ref 0–1.3)
MONOCYTES NFR BLD AUTO: 9.2 %
NEUTROPHILS # BLD AUTO: 2.9 10E9/L (ref 1.6–8.3)
NEUTROPHILS NFR BLD AUTO: 42.6 %
PLATELET # BLD AUTO: 324 10E9/L (ref 150–450)
POTASSIUM SERPL-SCNC: 3.8 MMOL/L (ref 3.4–5.3)
PROT SERPL-MCNC: 8 G/DL (ref 6.8–8.8)
RBC # BLD AUTO: 5.11 10E12/L (ref 4.4–5.9)
SODIUM SERPL-SCNC: 137 MMOL/L (ref 133–144)
WBC # BLD AUTO: 6.9 10E9/L (ref 4–11)

## 2020-03-17 PROCEDURE — 93306 TTE W/DOPPLER COMPLETE: CPT | Mod: 26 | Performed by: INTERNAL MEDICINE

## 2020-03-17 PROCEDURE — 93306 TTE W/DOPPLER COMPLETE: CPT

## 2020-03-17 PROCEDURE — 80053 COMPREHEN METABOLIC PANEL: CPT | Performed by: INTERNAL MEDICINE

## 2020-03-17 PROCEDURE — 85025 COMPLETE CBC W/AUTO DIFF WBC: CPT | Performed by: INTERNAL MEDICINE

## 2020-03-17 PROCEDURE — 85652 RBC SED RATE AUTOMATED: CPT | Performed by: INTERNAL MEDICINE

## 2020-03-17 PROCEDURE — 93000 ELECTROCARDIOGRAM COMPLETE: CPT | Performed by: INTERNAL MEDICINE

## 2020-03-17 PROCEDURE — 86140 C-REACTIVE PROTEIN: CPT | Performed by: INTERNAL MEDICINE

## 2020-03-17 PROCEDURE — 36415 COLL VENOUS BLD VENIPUNCTURE: CPT | Performed by: INTERNAL MEDICINE

## 2020-03-18 ENCOUNTER — VIRTUAL VISIT (OUTPATIENT)
Dept: CARDIOLOGY | Facility: CLINIC | Age: 20
End: 2020-03-18
Attending: INTERNAL MEDICINE
Payer: COMMERCIAL

## 2020-03-18 VITALS — BODY MASS INDEX: 23.02 KG/M2 | HEIGHT: 69 IN

## 2020-03-18 DIAGNOSIS — I31.9 MYOPERICARDITIS: ICD-10-CM

## 2020-03-18 PROCEDURE — 99212 OFFICE O/P EST SF 10 MIN: CPT | Mod: TEL | Performed by: PHYSICIAN ASSISTANT

## 2020-03-18 NOTE — PATIENT INSTRUCTIONS
Today's Plan:   1) No new changes.   2) Follow up as needed.    If you have questions or concerns please call my nurse team at [phone].     Scheduling phone number: [phone]  Reminder: Please bring in all current medications, over the counter supplements and vitamin bottles to your next appointment.    It was a pleasure seeing you today!     Campos Quiñones PA-C  3/18/2020

## 2020-03-18 NOTE — PROGRESS NOTES
"Barak Urrutia is a 19 year old male who is being evaluated via a billable telephone visit.      The patient has been notified of following:     \"This telephone visit will be conducted via a call between you and your physician/provider. We have found that certain health care needs can be provided without the need for a physical exam.  This service lets us provide the care you need with a short phone conversation.  If a prescription is necessary we can send it directly to your pharmacy.  If lab work is needed we can place an order for that and you can then stop by our lab to have the test done at a later time.    If during the course of the call the physician/provider feels a telephone visit is not appropriate, you will not be charged for this service.\"       Barak Urrutia complains of  No chief complaint on file.      I have reviewed and updated the patient's Past Medical History, Social History, Family History and Medication List.    Barak did not check his weight or blood pressure today prior to appointment.    He has been out of his Metoprolol x 2 weeks.    ALLERGIES  Patient has no known allergies.    JERONIMO Vaz    History of Present Illness:  This a very pleasant 19-year-old gentleman with history of myopericarditis this past summer in the setting of Streptococcus pharyngitis as well as infectious mononucleosis.  During his hospital stay his troponin peaked at 46.7.  EKG changes were consistent with pericarditis.  He had mild leukocytosis.  He underwent cardiac MRI which showed extensive hyper enhancement consistent with myocarditis and mild LV dysfunction with an LVEF of 43% with global hypokinesis.  Given his elevated troponin he underwent CT coronary angiogram and this showed normal coronary anatomy without stenosis.  He was initiated on colchicine, ibuprofen, and low-dose metoprolol.  He saw Dr. Bernal in 8/2019 for hospital follow-up at which time his inflammatory markers were back to normal range.  He was " recommended to continue with colchicine for 8-10 more weeks.  He was also recommended to continue his metoprolol until his LV function normalized.    He presented to clinic yesterday for echocardiogram as well as labs.  His echocardiogram showed preserved biventricular function with no valve disease.  Overall his LV function normalized and he no longer had global hypokinesis that was noted previously.  His inflammatory markers remained normal.  His renal function was unremarkable.  He had normal electrolytes.  He had no evidence of anemia or other abnormalities on his CBC.    Both he and his mother are on the phone today.  He reports no symptoms of chest discomfort, shortness of breath, palpitations, PND, orthopnea, or any other symptoms.  He did finish both colchicine and metoprolol.  He started exercising and denies any symptoms.  He has no other questions or concerns at this time.    Assessment/Plan:  This a very pleasant 19-year-old gentleman with history of myopericarditis in the setting of Streptococcus pharyngitis and mononucleosis.  His LVEF was initially mildly reduced but this has normalized.  He has completed his colchicine therapy.  He has no recurrent symptoms.  He will be off of metoprolol at this time.  His inflammatory markers remain negative.  His rest of his labs which include CBC and BMP are unremarkable.  Overall he was very reassured with these results.  He will follow-up with us as needed.    Thank you for allowing me to participate in the care of this pleasant patient today.    Phone call contact time  Call Started at 1:22 pm  Call Ended at 1: 34 pm    Campos Quiñones PA-C   3/18/2020  Pager: [phone]

## 2020-09-20 ENCOUNTER — HOSPITAL ENCOUNTER (EMERGENCY)
Facility: CLINIC | Age: 20
Discharge: HOME | End: 2020-09-20
Attending: EMERGENCY MEDICINE | Admitting: EMERGENCY MEDICINE
Payer: COMMERCIAL

## 2020-09-20 ENCOUNTER — APPOINTMENT (OUTPATIENT)
Dept: GENERAL RADIOLOGY | Facility: CLINIC | Age: 20
End: 2020-09-20
Attending: EMERGENCY MEDICINE
Payer: COMMERCIAL

## 2020-09-20 VITALS
RESPIRATION RATE: 16 BRPM | HEART RATE: 75 BPM | TEMPERATURE: 98.5 F | SYSTOLIC BLOOD PRESSURE: 120 MMHG | OXYGEN SATURATION: 100 % | DIASTOLIC BLOOD PRESSURE: 69 MMHG

## 2020-09-20 DIAGNOSIS — R07.9 ACUTE CHEST PAIN: ICD-10-CM

## 2020-09-20 LAB
ANION GAP SERPL CALCULATED.3IONS-SCNC: 3 MMOL/L (ref 3–14)
BASOPHILS # BLD AUTO: 0 10E9/L (ref 0–0.2)
BASOPHILS NFR BLD AUTO: 0.5 %
BUN SERPL-MCNC: 13 MG/DL (ref 7–30)
CALCIUM SERPL-MCNC: 9.2 MG/DL (ref 8.5–10.1)
CHLORIDE SERPL-SCNC: 104 MMOL/L (ref 94–109)
CO2 SERPL-SCNC: 31 MMOL/L (ref 20–32)
CREAT SERPL-MCNC: 1.18 MG/DL (ref 0.66–1.25)
DIFFERENTIAL METHOD BLD: NORMAL
EOSINOPHIL # BLD AUTO: 0.1 10E9/L (ref 0–0.7)
EOSINOPHIL NFR BLD AUTO: 1.2 %
ERYTHROCYTE [DISTWIDTH] IN BLOOD BY AUTOMATED COUNT: 11.9 % (ref 10–15)
GFR SERPL CREATININE-BSD FRML MDRD: 87 ML/MIN/{1.73_M2}
GLUCOSE SERPL-MCNC: 119 MG/DL (ref 70–99)
HCT VFR BLD AUTO: 45 % (ref 40–53)
HGB BLD-MCNC: 15.5 G/DL (ref 13.3–17.7)
IMM GRANULOCYTES # BLD: 0 10E9/L (ref 0–0.4)
IMM GRANULOCYTES NFR BLD: 0.3 %
LYMPHOCYTES # BLD AUTO: 1.3 10E9/L (ref 0.8–5.3)
LYMPHOCYTES NFR BLD AUTO: 20 %
MCH RBC QN AUTO: 30.3 PG (ref 26.5–33)
MCHC RBC AUTO-ENTMCNC: 34.4 G/DL (ref 31.5–36.5)
MCV RBC AUTO: 88 FL (ref 78–100)
MONOCYTES # BLD AUTO: 0.4 10E9/L (ref 0–1.3)
MONOCYTES NFR BLD AUTO: 6 %
NEUTROPHILS # BLD AUTO: 4.8 10E9/L (ref 1.6–8.3)
NEUTROPHILS NFR BLD AUTO: 72 %
NRBC # BLD AUTO: 0 10*3/UL
NRBC BLD AUTO-RTO: 0 /100
PLATELET # BLD AUTO: 288 10E9/L (ref 150–450)
POTASSIUM SERPL-SCNC: 3.3 MMOL/L (ref 3.4–5.3)
RBC # BLD AUTO: 5.12 10E12/L (ref 4.4–5.9)
SODIUM SERPL-SCNC: 138 MMOL/L (ref 133–144)
TROPONIN I SERPL-MCNC: <0.015 UG/L (ref 0–0.04)
WBC # BLD AUTO: 6.7 10E9/L (ref 4–11)

## 2020-09-20 PROCEDURE — 99285 EMERGENCY DEPT VISIT HI MDM: CPT | Mod: 25

## 2020-09-20 PROCEDURE — 80048 BASIC METABOLIC PNL TOTAL CA: CPT | Performed by: EMERGENCY MEDICINE

## 2020-09-20 PROCEDURE — 84484 ASSAY OF TROPONIN QUANT: CPT | Performed by: EMERGENCY MEDICINE

## 2020-09-20 PROCEDURE — 93308 TTE F-UP OR LMTD: CPT

## 2020-09-20 PROCEDURE — 93005 ELECTROCARDIOGRAM TRACING: CPT

## 2020-09-20 PROCEDURE — 85025 COMPLETE CBC W/AUTO DIFF WBC: CPT | Performed by: EMERGENCY MEDICINE

## 2020-09-20 PROCEDURE — 71046 X-RAY EXAM CHEST 2 VIEWS: CPT

## 2020-09-20 RX ORDER — IBUPROFEN 600 MG/1
600 TABLET, FILM COATED ORAL EVERY 6 HOURS
Qty: 12 TABLET | Refills: 0 | Status: SHIPPED | OUTPATIENT
Start: 2020-09-20 | End: 2020-09-23

## 2020-09-20 ASSESSMENT — ENCOUNTER SYMPTOMS
BACK PAIN: 1
COUGH: 0
ABDOMINAL PAIN: 0
CHILLS: 0
SHORTNESS OF BREATH: 0
FEVER: 0

## 2020-09-20 NOTE — ED AVS SNAPSHOT
[facility] Emergency Department  [address]  [facility] [address]  Phone:  [phone]  Fax:  [phone]                                    Barak Urrutia   MRN: [id number]    Department:  [facility] Emergency Department   Date of Visit:  9/20/2020           After Visit Summary Signature Page    I have received my discharge instructions, and my questions have been answered. I have discussed any challenges I see with this plan with the nurse or doctor.    ..........................................................................................................................................  Patient/Patient Representative Signature      ..........................................................................................................................................  Patient Representative Print Name and Relationship to Patient    ..................................................               ................................................  Date                                   Time    ..........................................................................................................................................  Reviewed by Signature/Title    ...................................................              ..............................................  Date                                               Time          22EPIC Rev 08/18

## 2020-09-21 ENCOUNTER — PATIENT OUTREACH (OUTPATIENT)
Dept: CARE COORDINATION | Facility: CLINIC | Age: 20
End: 2020-09-21

## 2020-09-21 DIAGNOSIS — R07.9 ACUTE CHEST PAIN: Primary | ICD-10-CM

## 2020-09-21 LAB — INTERPRETATION ECG - MUSE: NORMAL

## 2020-09-21 NOTE — PROGRESS NOTES
Patient was hospitalized at  [facility] from 9/20  to 9/20 with diagnosis of Acute Chest pain. DIPIKA CC reviewed pt chart following discharge. Discharge recommendations include close follow up with PCP if pain continues. Pt up to date on annual well exam. DIPIKA CC documented with [facility] PCP with update of recent visit.  No SW CC outreach planned.

## 2020-09-21 NOTE — ED PROVIDER NOTES
History     Chief Complaint:  Chest Pain      HPI   Barak Urrutia is a 20 year old male with a history of myopericarditis who presents with chest pain. The patient was admitted on 7/10/19 with chest pain in the setting of recent strep pharyngitis diagnosis. He was transferred to [facility] for cardiac MRI after abnormal echocardiogram and ST segment elevations on EKG and Troponin elevated at 14 - 51. Chest X-ray was negative. While at [facility], cardiac MRI confirmed myopericarditis with EF 43%. He was followed up with Dr. Bernal in 8/2019 where his inflammatory markers were back to normal range. He was seen by cardiology most recently on 3/18 after having an echocardiogram on 3/17. His echocardiogram showed his LV function normalized and he no longer had global hypokinesis. His inflammatory markers remained normal.     Here in the Emergency Department, Barak reports he is having chest pain which is similar, but more mild than his previous episode of myopericarditis. He describes his pain as pins and needles in his left chest radiating to his back and down his left arm. He denies shortness of breath, recent illness, fevers, chills, cough, cold, abdominal pain, swelling, and rashes.     Allergies:  No known drug allergies    Medications:    Toprol  Albuterol inhaler  Colycrys    Past Medical History:    Exercise-induced asthma  Myocarditis  Secondary cardiomyopathy    Past Surgical History:    History reviewed. No pertinent surgical history.    Family History:    History reviewed. No pertinent family history.     Social History:  Smoking status: Yes, e-cig 1-2 times a day  Alcohol use: Yes, rare  PCP: Kendrick Montero  Presents to the ED with parents  Marital Status:  Single [1]    Review of Systems   Constitutional: Negative for chills and fever.   Respiratory: Negative for cough and shortness of breath.    Cardiovascular: Positive for chest pain.   Gastrointestinal: Negative for abdominal pain.   Musculoskeletal:  Positive for back pain.   Skin: Negative for rash.   All other systems reviewed and are negative.        Physical Exam     Patient Vitals for the past 24 hrs:   BP Temp Temp src Pulse Resp SpO2   09/20/20 2210 120/69 -- -- 75 16 100 %   09/20/20 2030 125/78 -- -- 87 -- 99 %   09/20/20 2018 (!) 140/82 98.5  F (36.9  C) Oral 93 20 99 %     Physical Exam  General: Patient is alert and interactive when I enter the room  Head:  The scalp, face, and head appear normal  Eyes:  The pupils are equal, round, and reactive to light    Conjunctivae and sclerae are normal  ENT:    External acoustic canals are normal    The oropharynx is normal without erythema.     Uvula is in the midline  Neck:  Normal range of motion  CV:  Regular rate. S1/S2. No murmurs. No friction rub noted even when ascultated in two different positions.   Resp:  Lungs are clear without wheezes or rales. No distress  GI:  Abdomen is soft, no rigidity, guarding, or rebound    No distension. No tenderness to palpation in any quadrant.     MS:  Normal tone. Joints grossly normal without effusions.     No asymmetric leg swelling, calf or thigh tenderness.      Normal motor assessment of all extremities.  Skin:  No rash or lesions noted. Normal capillary refill noted  Neuro: Speech is normal and fluent. Face is symmetric.     Moving all extremities well.   Psych:  Awake. Alert.  Normal affect.  Appropriate interactions.  Lymph: No anterior cervical lymphadenopathy noted        Emergency Department Course   ECG (20:31:29):  Rate 88 bpm. MT interval 150. QRS duration 106. QT/QTc 354/428. P-R-T axes 61 89 42. Normal sinus rhythm. Normal ECG. No MT depression or diffuse ST elevation. Overall improved in diffuse ST elevation from 7/10/19. Interpreted at 2035 by Flo Leija MD.    Imaging:  Radiographic findings were communicated with the patient who voiced understanding of the findings.    XR Chest 2 Views  No evidence of active cardiopulmonary  disease.  As read by Radiology.    XR Chest 2 Views   Final Result   IMPRESSION: No evidence of active cardiopulmonary disease.       POC US ECHO LIMITED   Final Result   Procedure Note:       Emergency Medicine Limited Cardiac Ultrasound:       INDICATION: Chest pain   PERFORMED BY: Flo Leija MD   PROBE: Cardiac probe   BODY LOCATION: Chest wall   FINDINGS:    The ultrasound was performed utilizing the parasternal long axis and parasternal short axis views.   Cardiac contractility:  Present   Gross estimation of cardiac kinesis: normal   Pericardial Effusion:  None   INTERPRETATION:    Chamber size and motion were grossly normal with LV > RV, normal cardiac kinesis.  No pericardial effusion was found.       INTERPRETATION: No pericardial effusion, No change in normal LV/RV ratio to suggest massive pulmonary embolus.    IMAGE DOCUMENTATION: Images were archived to [facility]               Laboratory:  CBC: WNL (WBC 6.7, HGB 15.5, )  BMP: Potassium 3.3 (L), Glucose 119 (H) o/w WNL (Creatinine 1.18)  Troponin I (Collected 2057): <0.015    Emergency Department Course:  Past medical records, nursing notes, and vitals reviewed.  2039: I performed an exam of the patient and obtained history, as documented above.  EKG performed, results above.  The patient was sent for a chest x-ray while in the emergency department, findings above.  IV inserted and blood drawn.    2154: I rechecked the patient. I performed a POC cardiac ultrasound. See results above.    Findings and plan explained to the Patient and mother and father. Patient discharged home with instructions regarding supportive care, medications, and reasons to return. The importance of close follow-up was reviewed.     Impression & Plan    Medical Decision Making:  Barak Urrutia is a 20 year old male child who presents for evaluation of chest pain.  I believe he is very low risk for PE.  The work up in the Emergency Department is negative.  I considered a broad  differential diagnosis for the patient's chest pain including life threatening etiologies such as HOCM, anomalous coronary artery, acute coronary syndrome, myocardial infarction, pulmonary embolism, dissection, myocarditis, pericarditis, amongst others.  Other causes considered included pneumonia, pneumothorax or pneumomediastinum, chest wall source, pericarditis, pleurisy, esophageal spasm, etc. he has a history of pericarditis and myocarditis, I find no evidence at this point by EKG, suggestive symptoms nor troponin is having myocarditis again does he have any evidence of pericarditis like he did the first time on EKG.  His ultrasound, while not a formal echocardiogram, shows no fluid around the heart which is reassuring.    No serious etiology for the chest pain were detected today during this visit.  Close follow up with primary care is indicated should the pain continue, as further work up may be performed; this was made clear to the patient and family, who understands.       Diagnosis:    ICD-10-CM    1. Acute chest pain  R07.9        Disposition:  discharged to home    Discharge Medications:  Discharge Medication List as of 9/20/2020 10:07 PM      START taking these medications    Details   ibuprofen (ADVIL/MOTRIN) 600 MG tablet Take 1 tablet (600 mg) by mouth every 6 hours for 3 days Take every 6 hours with food for three days., Disp-12 tablet,R-0, Local Print             Stephane Espinoza  9/20/2020   [facility] EMERGENCY DEPARTMENT  I, Stephane Espinoza, am serving as a scribe at 8:39 PM on 9/20/2020 to document services personally performed by Flo Leija MD based on my observations and the provider's statements to me.        Flo Leija MD  09/20/20 2659

## 2020-09-21 NOTE — ED TRIAGE NOTES
Pt states chest pain for one day, states hx of myopericarditis one year ago. States has been cleared by cardiology since then. ABCs intact GCS 15

## 2020-11-16 ENCOUNTER — HEALTH MAINTENANCE LETTER (OUTPATIENT)
Age: 20
End: 2020-11-16

## 2021-04-04 ENCOUNTER — HEALTH MAINTENANCE LETTER (OUTPATIENT)
Age: 21
End: 2021-04-04

## 2021-09-18 ENCOUNTER — HEALTH MAINTENANCE LETTER (OUTPATIENT)
Age: 21
End: 2021-09-18

## 2022-04-30 ENCOUNTER — HEALTH MAINTENANCE LETTER (OUTPATIENT)
Age: 22
End: 2022-04-30

## 2022-11-20 ENCOUNTER — HEALTH MAINTENANCE LETTER (OUTPATIENT)
Age: 22
End: 2022-11-20

## 2023-06-01 ENCOUNTER — HEALTH MAINTENANCE LETTER (OUTPATIENT)
Age: 23
End: 2023-06-01

## (undated) RX ORDER — NITROGLYCERIN 0.4 MG/1
TABLET SUBLINGUAL
Status: DISPENSED
Start: 2019-07-11